# Patient Record
Sex: FEMALE | Race: WHITE | NOT HISPANIC OR LATINO | URBAN - METROPOLITAN AREA
[De-identification: names, ages, dates, MRNs, and addresses within clinical notes are randomized per-mention and may not be internally consistent; named-entity substitution may affect disease eponyms.]

---

## 2017-10-12 RX ORDER — ACETAMINOPHEN 325 MG/1
650 TABLET ORAL EVERY 6 HOURS PRN
COMMUNITY

## 2017-10-12 RX ORDER — ASCORBIC ACID 500 MG
500 TABLET ORAL DAILY
COMMUNITY

## 2017-10-12 NOTE — PRE-PROCEDURE INSTRUCTIONS
Pre-Surgery Instructions:   Medication Instructions    acetaminophen (TYLENOL) 325 mg tablet Instructed patient per Anesthesia Guidelines   ascorbic acid (VITAMIN C) 500 mg tablet Instructed patient per Anesthesia Guidelines  REVIEWED  PRINTED SURGICAL INSTRUCTIONS WITH PATIENT , PATIENT VERBALIZED UNDERSTANDING   MEDICATIONS REVIEWED    Please call   [phone]  With time of surgery    Home phone not working well

## 2017-10-13 NOTE — H&P
H&P Exam - Gynecology   Silvana Dukes 50 y o  female MRN: [id number]  Unit/Bed#:  Encounter: [id number]        Assessment: chronic right pelvic pain                         Complex right ovarian cyst    Plan: laparoscopic right cystectomy           Possible RSO           Bilateral salpingectomy                             HPI:  Silvana Dukes is a 50 y  o  female who presents with chronic right pelvic pain worse since   U/S on 17: ut 10 6 x 4 3 x 5 9 cm no fibroids  Normal left ovary  Right ovary:complex cyst; 4 0 x 2 6 x 3 2 cm  Endometrial ablation '  BTL '00   x2  Normal PAP with negative HPV   Denies any vaginal bleeding  Review of Systems   Constitutional: Negative  HENT: Negative  Eyes: Negative  Respiratory: Negative  Cardiovascular: Negative  Gastrointestinal: Negative  Endocrine: Negative  Genitourinary: Positive for pelvic pain  Musculoskeletal: Negative  Skin: Negative  Allergic/Immunologic: Negative  Neurological: Negative  Hematological: Negative  Psychiatric/Behavioral: Negative  PMH/PSH see HPI    OB/GYN History: x2    No family history on file  Social History   History   Alcohol Use    Yes     Comment: occasional     History   Drug Use No     History   Smoking Status    Never Smoker   Smokeless Tobacco    Never Used     MEDS: none    Tab:none    No Known Allergies    Objective   Vitals: Height 5' 2" (1 575 m), weight 69 4 kg (153 lb)  No intake or output data in the 24 hours ending 10/13/17 1008    Invasive Devices: Invasive Devices          No matching active lines, drains, or airways          Physical Exam   Constitutional: She is oriented to person, place, and time  She appears well-developed and well-nourished  HENT:   Head: Normocephalic and atraumatic  Neck: Normal range of motion  Neck supple     Cardiovascular: Normal rate and regular rhythm  Pulmonary/Chest: Effort normal and breath sounds normal    Abdominal: Soft  Bowel sounds are normal    Genitourinary: Vagina normal and uterus normal  Cervix exhibits no motion tenderness, no discharge and no friability  Right adnexum displays mass, tenderness and fullness  Left adnexum displays no mass, no tenderness and no fullness  Musculoskeletal: Normal range of motion  Neurological: She is alert and oriented to person, place, and time  She has normal reflexes  Skin: Skin is warm and dry  Psychiatric: She has a normal mood and affect   Her behavior is normal  Judgment and thought content normal

## 2017-10-16 ENCOUNTER — ANESTHESIA EVENT (OUTPATIENT)
Dept: PERIOP | Facility: HOSPITAL | Age: 47
End: 2017-10-16
Payer: COMMERCIAL

## 2017-10-17 ENCOUNTER — HOSPITAL ENCOUNTER (OUTPATIENT)
Facility: HOSPITAL | Age: 47
Setting detail: OUTPATIENT SURGERY
Discharge: HOME | End: 2017-10-17
Attending: OBSTETRICS & GYNECOLOGY | Admitting: OBSTETRICS & GYNECOLOGY
Payer: COMMERCIAL

## 2017-10-17 ENCOUNTER — ANESTHESIA (OUTPATIENT)
Dept: PERIOP | Facility: HOSPITAL | Age: 47
End: 2017-10-17
Payer: COMMERCIAL

## 2017-10-17 VITALS
HEART RATE: 70 BPM | WEIGHT: 153 LBS | TEMPERATURE: 97.2 F | BODY MASS INDEX: 28.16 KG/M2 | OXYGEN SATURATION: 98 % | SYSTOLIC BLOOD PRESSURE: 105 MMHG | DIASTOLIC BLOOD PRESSURE: 59 MMHG | HEIGHT: 62 IN | RESPIRATION RATE: 16 BRPM

## 2017-10-17 DIAGNOSIS — R10.31 RIGHT LOWER QUADRANT PAIN: ICD-10-CM

## 2017-10-17 DIAGNOSIS — N83.209 OVARIAN CYST: ICD-10-CM

## 2017-10-17 PROCEDURE — 88305 TISSUE EXAM BY PATHOLOGIST: CPT | Performed by: OBSTETRICS & GYNECOLOGY

## 2017-10-17 RX ORDER — ROCURONIUM BROMIDE 10 MG/ML
INJECTION, SOLUTION INTRAVENOUS AS NEEDED
Status: DISCONTINUED | OUTPATIENT
Start: 2017-10-17 | End: 2017-10-17 | Stop reason: SURG

## 2017-10-17 RX ORDER — MIDAZOLAM HYDROCHLORIDE 1 MG/ML
INJECTION INTRAMUSCULAR; INTRAVENOUS AS NEEDED
Status: DISCONTINUED | OUTPATIENT
Start: 2017-10-17 | End: 2017-10-17 | Stop reason: SURG

## 2017-10-17 RX ORDER — FENTANYL CITRATE/PF 50 MCG/ML
50 SYRINGE (ML) INJECTION
Status: DISCONTINUED | OUTPATIENT
Start: 2017-10-17 | End: 2017-10-17

## 2017-10-17 RX ORDER — FENTANYL CITRATE/PF 50 MCG/ML
25 SYRINGE (ML) INJECTION
Status: DISCONTINUED | OUTPATIENT
Start: 2017-10-17 | End: 2017-10-17 | Stop reason: HOSPADM

## 2017-10-17 RX ORDER — ONDANSETRON 2 MG/ML
4 INJECTION INTRAMUSCULAR; INTRAVENOUS ONCE AS NEEDED
Status: DISCONTINUED | OUTPATIENT
Start: 2017-10-17 | End: 2017-10-17 | Stop reason: HOSPADM

## 2017-10-17 RX ORDER — LIDOCAINE HYDROCHLORIDE 10 MG/ML
INJECTION, SOLUTION INFILTRATION; PERINEURAL AS NEEDED
Status: DISCONTINUED | OUTPATIENT
Start: 2017-10-17 | End: 2017-10-17 | Stop reason: SURG

## 2017-10-17 RX ORDER — KETOROLAC TROMETHAMINE 30 MG/ML
INJECTION, SOLUTION INTRAMUSCULAR; INTRAVENOUS AS NEEDED
Status: DISCONTINUED | OUTPATIENT
Start: 2017-10-17 | End: 2017-10-17 | Stop reason: SURG

## 2017-10-17 RX ORDER — FENTANYL CITRATE 50 UG/ML
INJECTION, SOLUTION INTRAMUSCULAR; INTRAVENOUS AS NEEDED
Status: DISCONTINUED | OUTPATIENT
Start: 2017-10-17 | End: 2017-10-17 | Stop reason: SURG

## 2017-10-17 RX ORDER — IBUPROFEN 600 MG/1
600 TABLET ORAL EVERY 6 HOURS PRN
Status: DISCONTINUED | OUTPATIENT
Start: 2017-10-17 | End: 2017-10-17 | Stop reason: HOSPADM

## 2017-10-17 RX ORDER — SODIUM CHLORIDE, SODIUM LACTATE, POTASSIUM CHLORIDE, CALCIUM CHLORIDE 600; 310; 30; 20 MG/100ML; MG/100ML; MG/100ML; MG/100ML
125 INJECTION, SOLUTION INTRAVENOUS CONTINUOUS
Status: DISCONTINUED | OUTPATIENT
Start: 2017-10-17 | End: 2017-10-17 | Stop reason: HOSPADM

## 2017-10-17 RX ORDER — PROPOFOL 10 MG/ML
INJECTION, EMULSION INTRAVENOUS AS NEEDED
Status: DISCONTINUED | OUTPATIENT
Start: 2017-10-17 | End: 2017-10-17 | Stop reason: SURG

## 2017-10-17 RX ORDER — GLYCOPYRROLATE 0.2 MG/ML
INJECTION INTRAMUSCULAR; INTRAVENOUS AS NEEDED
Status: DISCONTINUED | OUTPATIENT
Start: 2017-10-17 | End: 2017-10-17 | Stop reason: SURG

## 2017-10-17 RX ORDER — EPHEDRINE SULFATE 50 MG/ML
INJECTION, SOLUTION INTRAVENOUS AS NEEDED
Status: DISCONTINUED | OUTPATIENT
Start: 2017-10-17 | End: 2017-10-17 | Stop reason: SURG

## 2017-10-17 RX ORDER — OXYCODONE HYDROCHLORIDE AND ACETAMINOPHEN 5; 325 MG/1; MG/1
1 TABLET ORAL EVERY 4 HOURS PRN
Qty: 5 TABLET | Refills: 0 | Status: SHIPPED | OUTPATIENT
Start: 2017-10-17

## 2017-10-17 RX ORDER — BUPIVACAINE HYDROCHLORIDE 2.5 MG/ML
INJECTION, SOLUTION INFILTRATION; PERINEURAL AS NEEDED
Status: DISCONTINUED | OUTPATIENT
Start: 2017-10-17 | End: 2017-10-17 | Stop reason: HOSPADM

## 2017-10-17 RX ORDER — OXYCODONE HYDROCHLORIDE AND ACETAMINOPHEN 5; 325 MG/1; MG/1
1 TABLET ORAL EVERY 4 HOURS PRN
Status: DISCONTINUED | OUTPATIENT
Start: 2017-10-17 | End: 2017-10-17 | Stop reason: HOSPADM

## 2017-10-17 RX ADMIN — SODIUM CHLORIDE, SODIUM LACTATE, POTASSIUM CHLORIDE, AND CALCIUM CHLORIDE: .6; .31; .03; .02 INJECTION, SOLUTION INTRAVENOUS at 11:11

## 2017-10-17 RX ADMIN — MIDAZOLAM HYDROCHLORIDE 2 MG: 1 INJECTION, SOLUTION INTRAMUSCULAR; INTRAVENOUS at 10:06

## 2017-10-17 RX ADMIN — FENTANYL CITRATE 25 MCG: 50 INJECTION INTRAMUSCULAR; INTRAVENOUS at 11:42

## 2017-10-17 RX ADMIN — EPHEDRINE SULFATE 5 MG: 50 INJECTION, SOLUTION INTRAMUSCULAR; INTRAVENOUS; SUBCUTANEOUS at 10:53

## 2017-10-17 RX ADMIN — IBUPROFEN 600 MG: 600 TABLET ORAL at 13:49

## 2017-10-17 RX ADMIN — SODIUM CHLORIDE, SODIUM LACTATE, POTASSIUM CHLORIDE, AND CALCIUM CHLORIDE 125 ML/HR: .6; .31; .03; .02 INJECTION, SOLUTION INTRAVENOUS at 07:44

## 2017-10-17 RX ADMIN — FENTANYL CITRATE 25 MCG: 50 INJECTION INTRAMUSCULAR; INTRAVENOUS at 11:49

## 2017-10-17 RX ADMIN — KETOROLAC TROMETHAMINE 30 MG: 30 INJECTION, SOLUTION INTRAMUSCULAR at 10:54

## 2017-10-17 RX ADMIN — GLYCOPYRROLATE 0.8 MG: 0.2 INJECTION, SOLUTION INTRAMUSCULAR; INTRAVENOUS at 11:02

## 2017-10-17 RX ADMIN — DEXAMETHASONE SODIUM PHOSPHATE 10 MG: 10 INJECTION INTRAMUSCULAR; INTRAVENOUS at 10:18

## 2017-10-17 RX ADMIN — FENTANYL CITRATE 25 MCG: 50 INJECTION, SOLUTION INTRAMUSCULAR; INTRAVENOUS at 11:01

## 2017-10-17 RX ADMIN — FENTANYL CITRATE 25 MCG: 50 INJECTION INTRAMUSCULAR; INTRAVENOUS at 11:55

## 2017-10-17 RX ADMIN — FENTANYL CITRATE 100 MCG: 50 INJECTION, SOLUTION INTRAMUSCULAR; INTRAVENOUS at 10:12

## 2017-10-17 RX ADMIN — OXYCODONE HYDROCHLORIDE AND ACETAMINOPHEN 1 TABLET: 5; 325 TABLET ORAL at 14:19

## 2017-10-17 RX ADMIN — NEOSTIGMINE METHYLSULFATE 4 MG: 1 INJECTION, SOLUTION INTRAMUSCULAR; INTRAVENOUS; SUBCUTANEOUS at 11:02

## 2017-10-17 RX ADMIN — LIDOCAINE HYDROCHLORIDE 100 MG: 10 INJECTION, SOLUTION INFILTRATION; PERINEURAL at 10:12

## 2017-10-17 RX ADMIN — PROPOFOL 200 MG: 10 INJECTION, EMULSION INTRAVENOUS at 10:12

## 2017-10-17 RX ADMIN — ROCURONIUM BROMIDE 30 MG: 10 INJECTION, SOLUTION INTRAVENOUS at 10:12

## 2017-10-17 NOTE — DISCHARGE INSTRUCTIONS
Laparoscopic Salpingo-oophorectomy   WHAT YOU SHOULD KNOW:   Laparoscopic salpingo-oophorectomy is surgery to remove one or both fallopian tubes together with the ovaries  AFTER YOU LEAVE:   Medicines:   · NSAIDs  help decrease swelling, pain, and fever  This medicine is available without a doctor's order  NSAIDs can cause stomach bleeding or kidney problems  If you take blood thinner medicine, always ask your primary healthcare provider (PHP) if NSAIDs are safe for you  Always read the medicine label and follow directions  · Acetaminophen  decreases pain and fever  It is available without a doctor's order  Ask how much to take and how often to take it  Follow directions  Acetaminophen can cause liver damage if not taken correctly  · Prescription pain medicine  may be given  Ask your PHP how to take this medicine safely  · Take your medicine as directed  Contact your PHP if you think your medicine is not helping or if you have side effects  Tell him if you are allergic to any medicine  Keep a list of the medicines, vitamins, and herbs you take  Include the amounts, and when and why you take them  Bring the list or the pill bottles to follow-up visits  Carry your medicine list with you in case of an emergency  Follow up with your surgeon or gynecologist as directed:  Write down your questions so you remember to ask them during your visits  Wound care:  Carefully wash the wound with soap and water  Dry the area and put on new, clean bandages as directed  Change your bandages when they get wet or dirty  Counseling: This surgery may be life-changing for you and your family  Sudden changes in the levels of your hormones may occur and cause mood swings and depression  You may feel angry, sad, or frightened, or cry frequently and unexpectedly  These feelings are normal  Talk to your caregivers, family, or friends about your feelings   You may need to attend meetings with a caregiver, family members, or other people who are close to you  These meetings can help everyone better understand your condition, surgery, and care  Activity:  Ask when you can return to your usual activities, such as exercise  It is best to start exercise slowly and do more as you get stronger  Exercise makes your heart stronger, lowers blood pressure, and keeps your bones healthy  Contact your surgeon or gynecologist if:   · You have a fever  · You have chills, a cough, or feel weak and achy  · You have nausea or are vomiting  · Your skin is itchy, swollen, or has a rash  · You have questions or concerns about your condition or care  Seek care immediately or call 911 if:   · You feel lightheaded, short of breath, and have chest pain  · You cough up blood  · Your arm or leg feels warm, tender, and painful  It may look swollen and red  · You feel something is bulging into your vagina, or you have vaginal bleeding  · You have lower abdominal or back pain that does not go away even after you take medicine  · You have pus or a foul-smelling odor coming from your vagina  · You have trouble urinating or having a bowel movement  · Blood soaks through your bandage  · Your symptoms return  © 2014 [employer] is for End User's use only and may not be sold, redistributed or otherwise used for commercial purposes  All illustrations and images included in CareNotes® are the copyrighted property of [employer]  or [employer]  The above information is an  only  It is not intended as medical advice for individual conditions or treatments  Talk to your doctor, nurse or pharmacist before following any medical regimen to see if it is safe and effective for you

## 2017-10-17 NOTE — OP NOTE
OPERATIVE REPORT  PATIENT NAME: Terese Shah    :  1970  MRN: [id number]  Pt Location: [facility] OR ROOM 05    SURGERY DATE: 10/17/2017    Surgeon(s) and Role:     * Danna Nolasco MD - Primary     * Bonnie Alberts MD - Assisting    Preop Diagnosis:  Ovarian cyst [R82 406]  Right lower quadrant pain [R10 31]    Post-Op Diagnosis Codes:     * Ovarian cyst [D20 870]     * Right lower quadrant pain [R10 31]    Procedure(s) (LRB):  LAPAROSCOPIC SALPINGECTOMY RIGHT AND LEFT TUBE; EXCISION OF OVARIAN CYST; POSSIBLE RSO (N/A)    Specimen(s):  ID Type Source Tests Collected by Time Destination   1 : right ovary and tube Tissue Ovary, Right TISSUE EXAM Danna Nolasco MD 10/17/2017 105    2 :  Tissue Fallopian Tube, Left TISSUE EXAM Danna Nolasco MD 10/17/2017 105        Estimated Blood Loss:   <10cc    Drains:  None    Anesthesia Type:   General    Operative Indications:  Ovarian cyst [N83 209]  Right lower quadrant pain [R10 31]      Operative Findings:  A normal appearing external genitalia  Normal appearing cervix  Uterus sounded to 8 cm  Normal appearing left ovary  Right ovarian cyst  Bilateral fimbriae present  Minimal adhesive disease  Endometrial implants noted in the posterior cul-de-sac    Complications:   None    Procedure and Technique:  The patient was taking to the operating room where a timeout was performed to confirm correct patient and correct procedure  The patient was given general anesthesia and was then positioned  on the operating table in the dorsal lithotomy position with legs Supported by stirrups  All pressure points were padded and the Emilio hugger was placed to maintain control of core body temperature  The patient was then prepped and draped in the usual sterile fashion  A straight catheter was used to drain urine from the bladder  A weighted speculum was inserted for visualization of the cervix, which was then grasped with a single-tooth tenaculum    A dilator was then attached to the tenaculum and used for uterine manipulation       Attention was then turned to the abdomen where a 5 mm vertical incision was made in the infraumbilical region and a 5 mm trocar was introduced under direct visualization with the laparoscope within the trocar  The laparoscope was then placed through the trocar and pneumoperitoneum was established, using carbon dioxide  The abdomen was inspected in a symmetric manner  The uterus ovaries and remnants of fallopian tubes were identified  A right ovarian cyst was appreciated  An Additional 5 mm right lateral port was inserted under direct visualization  As well as a 10 mm suprapubic port to assist with removal of specimen        Attention was then turned to the pelvis  The right ovary  was stabilized by grasping the ovarian  tissue with the atraumatic forceps along the along with the tubal remnants  The ovary and fallopian tube were tented up at the inferior Infundibulum ligament and the EnSeal was placed across this and several bites were taken with good visualization while coagulating and cutting  Attention was then turned to the contralateral adnexa  The remnant of the left fallopian tube was grasped and the EnSeal device used to coagulate and cut along the mesosalpinx  The specimens were placed in an Endo-Catch bag and removed through the 10 mm port without difficulty  Specimen was then sent to pathology  The pelvic and abdomen were inspected, and good hemostasis was confirmed  Pneumoperitoneum was evacuated  The fascia of the 10 mm port was closed with PDS and the remaining ports were removed and the incisions was closed using  4-0 Monocryl  Suture and Histoacryl      Attention was then turned to the perineum  The sponge stick  was removed and hemostasis was confirmed  All needles , sponge, instruction count was correct x3 at the end of the procedure   The patient tolerated the procedure well and was transferred to the recovery room in stable condition  Dr Dori Mcconnell was present and participate din the entire procedure        Patient Disposition:  PACU     SIGNATURE: Leidy Louis MD  DATE: October 17, 2017  TIME: 11:27 AM

## 2017-10-17 NOTE — ANESTHESIA PREPROCEDURE EVALUATION
Review of Systems/Medical History      No history of anesthetic complications     Cardiovascular  Negative cardio ROS Exercise tolerance: good,     Pulmonary  Negative pulmonary ROS ,        GI/Hepatic  Negative GI/hepatic ROS          Negative  ROS        Endo/Other  Negative endo/other ROS      GYN       Hematology  Negative hematology ROS      Musculoskeletal  Negative musculoskeletal ROS        Neurology  Negative neurology ROS      Psychology   Negative psychology ROS            Physical Exam    Airway    Mallampati score: II  TM Distance: >3 FB  Neck ROM: full     Dental   No notable dental hx     Cardiovascular  Comment: Negative ROS, Cardiovascular exam normal    Pulmonary  Breath sounds clear to auscultation,     Other Findings        Anesthesia Plan  ASA Score- 1       Anesthesia Type- general with ASA Monitors  Additional Monitors:   Airway Plan: ETT  Induction- intravenous  Informed Consent- Anesthetic plan and risks discussed with patient and spouse  I personally reviewed this patient with the CRNA  Discussed and agreed on the Anesthesia Plan with the CRNA  Jai Almanza

## 2017-10-17 NOTE — ANESTHESIA POSTPROCEDURE EVALUATION
Post-Op Assessment Note      CV Status:  Stable    Mental Status:  Awake    Hydration Status:  Euvolemic    PONV Controlled:  Controlled    Airway Patency:  Patent    Post Op Vitals Reviewed: Yes          Staff: Anesthesiologist, other anesthesia staff           BP      Temp      Pulse     Resp      SpO2

## 2019-09-13 ENCOUNTER — ANNUAL EXAM (OUTPATIENT)
Dept: OBGYN CLINIC | Facility: CLINIC | Age: 49
End: 2019-09-13
Payer: COMMERCIAL

## 2019-09-13 VITALS
WEIGHT: 142 LBS | HEIGHT: 64 IN | DIASTOLIC BLOOD PRESSURE: 80 MMHG | SYSTOLIC BLOOD PRESSURE: 130 MMHG | BODY MASS INDEX: 24.24 KG/M2

## 2019-09-13 DIAGNOSIS — Z12.11 SCREENING FOR COLON CANCER: ICD-10-CM

## 2019-09-13 DIAGNOSIS — Z01.419 ENCOUNTER FOR ANNUAL ROUTINE GYNECOLOGICAL EXAMINATION: ICD-10-CM

## 2019-09-13 DIAGNOSIS — Z12.39 SCREENING FOR BREAST CANCER: Primary | ICD-10-CM

## 2019-09-13 PROCEDURE — 99396 PREV VISIT EST AGE 40-64: CPT | Performed by: OBSTETRICS & GYNECOLOGY

## 2019-09-13 NOTE — PROGRESS NOTES
The patient is here for a yearly  The patient is not due for a pap smear  No bleeding or cramping  No vaginal or urinary issues  No breast concerns

## 2019-09-13 NOTE — PROGRESS NOTES
Assessment/Plan:    Impression:  1  Normal Breast and GYN exam - right ovary not present due to RSO in 2017      Plan:  1  Rx given for mammogram  2  Rx given for colonoscopy    3  Patient educated on healthy diet and exercise    Subjective:      Patient ID: Anthony Baird is a  44 y o  female presenting for her annual exam  She has a history of RSO in 2017, and [address] in 2006  Patient denies bleeding and pelvic pain  Patient is adopted - no known family history  Review of Systems   Constitutional: Negative  Negative for fatigue, fever and unexpected weight change  HENT: Negative  Eyes: Negative  Respiratory: Negative  Negative for chest tightness, shortness of breath, wheezing and stridor  Cardiovascular: Negative  Negative for chest pain, palpitations and leg swelling  Gastrointestinal: Negative  Negative for abdominal pain, blood in stool, diarrhea, nausea, rectal pain and vomiting  Endocrine: Negative  Genitourinary: Negative for dysuria, frequency, vaginal bleeding, vaginal discharge and vaginal pain  Musculoskeletal: Negative  Skin: Negative  Allergic/Immunologic: Negative  Neurological: Negative  Hematological: Negative  Psychiatric/Behavioral: Negative  All other systems reviewed and are negative  Objective:      /80 (BP Location: Left arm, Patient Position: Sitting, Cuff Size: Standard)   Ht 5' 4 17" (1 63 m)   Wt 64 4 kg (142 lb)   LMP  (LMP Unknown)   BMI 24 24 kg/m²          Physical Exam   Constitutional: She is oriented to person, place, and time  She appears well-developed and well-nourished  HENT:   Head: Normocephalic and atraumatic  Neck: Normal range of motion  Neck supple  No tracheal deviation present  No thyromegaly present  Cardiovascular: Normal rate, regular rhythm and normal heart sounds  Pulmonary/Chest: Effort normal and breath sounds normal  No stridor  No respiratory distress  She has no wheezes   She has no rales  She exhibits no tenderness  Right breast exhibits no inverted nipple, no mass, no nipple discharge, no skin change and no tenderness  Left breast exhibits no inverted nipple, no mass, no nipple discharge, no skin change and no tenderness  No breast swelling, tenderness, discharge or bleeding  Breasts are symmetrical    Abdominal: Soft  Bowel sounds are normal  She exhibits no distension and no mass  There is no tenderness  There is no rebound and no guarding  No hernia  Hernia confirmed negative in the right inguinal area and confirmed negative in the left inguinal area  Genitourinary: Vagina normal and uterus normal  Rectal exam shows no external hemorrhoid, no internal hemorrhoid, no fissure and no mass  No breast swelling, tenderness, discharge or bleeding  No labial fusion  There is no rash, tenderness, lesion or injury on the right labia  There is no rash, tenderness, lesion or injury on the left labia  Uterus is not deviated, not enlarged, not fixed and not tender  Cervix exhibits no motion tenderness, no discharge and no friability  Right adnexum displays no mass, no tenderness and no fullness  Left adnexum displays no mass, no tenderness and no fullness  No erythema, tenderness or bleeding in the vagina  No foreign body in the vagina  No signs of injury around the vagina  No vaginal discharge found  Genitourinary Comments: Patient absent right ovary  No cystocele  No rectocele  Lymphadenopathy: No inguinal adenopathy noted on the right or left side  Neurological: She is alert and oriented to person, place, and time  Skin: Skin is warm and dry  Psychiatric: She has a normal mood and affect   Her behavior is normal  Judgment and thought content normal

## 2019-09-13 NOTE — PROGRESS NOTES
Assessment/Plan:    Impression:  1  Normal Breast and GYN exam - right ovary not present due to RSO in 2017     Plan:  1  Rx given for mammogram  2  Rx given for colonoscopy    3  Patient educated on healthy diet and exercise    Subjective:      Patient ID: Yazmin Jorge is a  44 y o  female presenting for her annual exam  She has a history of RSO in 2017, and [address] in 2006  Patient denies bleeding and pelvic pain  Patient is adopted - no known family history  Patients 21year old daughter is going through female to male transgender transition  Review of Systems   Constitutional: Negative  Negative for fatigue, fever and unexpected weight change  HENT: Negative  Eyes: Negative  Respiratory: Negative  Negative for chest tightness, shortness of breath, wheezing and stridor  Cardiovascular: Negative  Negative for chest pain, palpitations and leg swelling  Gastrointestinal: Negative  Negative for abdominal pain, blood in stool, diarrhea, nausea, rectal pain and vomiting  Endocrine: Negative  Genitourinary: Negative for dysuria, frequency, vaginal bleeding, vaginal discharge and vaginal pain  Musculoskeletal: Negative  Skin: Negative  Allergic/Immunologic: Negative  Neurological: Negative  Hematological: Negative  Psychiatric/Behavioral: Negative  All other systems reviewed and are negative  Objective:      /80 (BP Location: Left arm, Patient Position: Sitting, Cuff Size: Standard)   Ht 5' 4 17" (1 63 m)   Wt 64 4 kg (142 lb)   LMP  (LMP Unknown)   BMI 24 24 kg/m²          Physical Exam   Constitutional: She is oriented to person, place, and time  She appears well-developed and well-nourished  HENT:   Head: Normocephalic and atraumatic  Neck: Normal range of motion  Neck supple  No tracheal deviation present  No thyromegaly present  Cardiovascular: Normal rate, regular rhythm and normal heart sounds     Pulmonary/Chest: Effort normal and breath sounds normal  No stridor  No respiratory distress  She has no wheezes  She has no rales  She exhibits no tenderness  Right breast exhibits no inverted nipple, no mass, no nipple discharge, no skin change and no tenderness  Left breast exhibits no inverted nipple, no mass, no nipple discharge, no skin change and no tenderness  No breast swelling, tenderness, discharge or bleeding  Breasts are symmetrical    Abdominal: Soft  Bowel sounds are normal  She exhibits no distension and no mass  There is no tenderness  There is no rebound and no guarding  No hernia  Hernia confirmed negative in the right inguinal area and confirmed negative in the left inguinal area  Genitourinary: Vagina normal and uterus normal  Rectal exam shows no external hemorrhoid, no internal hemorrhoid, no fissure and no mass  No labial fusion  There is no rash, tenderness, lesion or injury on the right labia  There is no rash, tenderness, lesion or injury on the left labia  Uterus is not deviated, not enlarged, not fixed and not tender  Cervix exhibits no motion tenderness, no discharge and no friability  Right adnexum displays no mass, no tenderness and no fullness  Left adnexum displays no mass, no tenderness and no fullness  No erythema, tenderness or bleeding in the vagina  No foreign body in the vagina  No signs of injury around the vagina  No vaginal discharge found  Genitourinary Comments: Patient absent right ovary  No cystocele  No rectocele  Lymphadenopathy: No inguinal adenopathy noted on the right or left side  Neurological: She is alert and oriented to person, place, and time  Skin: Skin is warm and dry  Psychiatric: She has a normal mood and affect   Her behavior is normal  Judgment and thought content normal

## 2020-09-16 ENCOUNTER — ANNUAL EXAM (OUTPATIENT)
Dept: OBGYN CLINIC | Facility: CLINIC | Age: 50
End: 2020-09-16
Payer: COMMERCIAL

## 2020-09-16 VITALS
SYSTOLIC BLOOD PRESSURE: 120 MMHG | WEIGHT: 157 LBS | BODY MASS INDEX: 28.89 KG/M2 | DIASTOLIC BLOOD PRESSURE: 80 MMHG | HEIGHT: 62 IN

## 2020-09-16 DIAGNOSIS — Z12.11 SCREENING FOR COLORECTAL CANCER: ICD-10-CM

## 2020-09-16 DIAGNOSIS — Z01.419 ROUTINE GYNECOLOGICAL EXAMINATION: ICD-10-CM

## 2020-09-16 DIAGNOSIS — Z01.419 ENCOUNTER FOR ANNUAL ROUTINE GYNECOLOGICAL EXAMINATION: ICD-10-CM

## 2020-09-16 DIAGNOSIS — Z12.12 SCREENING FOR COLORECTAL CANCER: ICD-10-CM

## 2020-09-16 DIAGNOSIS — Z11.51 SCREENING FOR HPV (HUMAN PAPILLOMAVIRUS): ICD-10-CM

## 2020-09-16 DIAGNOSIS — Z12.31 ENCOUNTER FOR SCREENING MAMMOGRAM FOR BREAST CANCER: Primary | ICD-10-CM

## 2020-09-16 PROCEDURE — 99396 PREV VISIT EST AGE 40-64: CPT | Performed by: OBSTETRICS & GYNECOLOGY

## 2020-09-16 NOTE — PROGRESS NOTES
The patient is here for a yearly  right cystectomy RSO (10/2017), pap normal HPV neg 9/6/17, pap normal HPV neg 12/9/14, pap normal HPV neg 12/20/13  No bleeding or cramping  No vaginal, bowel, bladder or breast problems

## 2020-09-16 NOTE — PROGRESS NOTES
Assessment/Plan:     Normal breast and gyn exam   Due for colonoscopy  Plan:  Rx mammogram Rx colonoscopy  Continue healthy diet and exercise  Add extra vitamin-D 3 and zinc to her vitamin-C  Recommend flu vaccine    Subjective:      Patient ID: Lorrie Ruth is a 44 y  o  female presents for yearly exam with no complaints  Patient denies any pelvic pain vaginal bleeding breast bowel or bladder issues  Patient due for colonoscopy  Request given  No change in family history  Medications reviewed  Patient taking vitamin-C  Explained the benefits of vitamin D3 and zinc   Patient says she will consider getting a flu vaccine  Review of Systems   Constitutional: Negative  HENT: Negative  Eyes: Negative  Respiratory: Negative  Cardiovascular: Negative  Gastrointestinal: Negative  Endocrine: Negative  Musculoskeletal: Negative  Skin: Negative  Allergic/Immunologic: Negative  Neurological: Negative  Hematological: Negative  Psychiatric/Behavioral: Negative  All other systems reviewed and are negative  Objective:      /80 (BP Location: Left arm, Patient Position: Sitting, Cuff Size: Standard)   Ht 5' 1 81" (1 57 m)   Wt 71 2 kg (157 lb)   LMP  (LMP Unknown)   BMI 28 89 kg/m²          Physical Exam  Constitutional:       Appearance: She is well-developed  HENT:      Head: Normocephalic and atraumatic  Neck:      Musculoskeletal: Normal range of motion and neck supple  Thyroid: No thyromegaly  Trachea: No tracheal deviation  Cardiovascular:      Rate and Rhythm: Normal rate and regular rhythm  Heart sounds: Normal heart sounds  Pulmonary:      Effort: Pulmonary effort is normal  No respiratory distress  Breath sounds: Normal breath sounds  No stridor  No wheezing or rales  Chest:      Chest wall: No tenderness        Breasts: Breasts are symmetrical          Right: No inverted nipple, mass, nipple discharge, skin change or tenderness  Left: No inverted nipple, mass, nipple discharge, skin change or tenderness  Abdominal:      General: Bowel sounds are normal  There is no distension  Palpations: Abdomen is soft  There is no mass  Tenderness: There is no abdominal tenderness  There is no guarding or rebound  Hernia: No hernia is present  There is no hernia in the left inguinal area  Genitourinary:     Labia:         Right: No rash, tenderness, lesion or injury  Left: No rash, tenderness, lesion or injury  Vagina: Normal  No signs of injury and foreign body  No vaginal discharge, erythema, tenderness or bleeding  Cervix: No cervical motion tenderness, discharge or friability  Uterus: Not deviated, not enlarged, not fixed and not tender  Adnexa:         Right: No mass, tenderness or fullness  Left: No mass, tenderness or fullness  Rectum: No mass, anal fissure, external hemorrhoid or internal hemorrhoid  Lymphadenopathy:      Lower Body: No right inguinal adenopathy  No left inguinal adenopathy  Skin:     General: Skin is warm and dry  Neurological:      Mental Status: She is alert and oriented to person, place, and time  Psychiatric:         Behavior: Behavior normal          Thought Content:  Thought content normal          Judgment: Judgment normal

## 2020-09-19 LAB
HPV HR 12 DNA CVX QL NAA+PROBE: DETECTED
HPV16 DNA SPEC QL NAA+PROBE: NOT DETECTED
HPV18 DNA SPEC QL NAA+PROBE: NOT DETECTED
THIN PREP CVX: ABNORMAL

## 2021-03-01 ENCOUNTER — OFFICE VISIT (OUTPATIENT)
Dept: OBGYN CLINIC | Facility: CLINIC | Age: 51
End: 2021-03-01
Payer: COMMERCIAL

## 2021-03-01 VITALS
WEIGHT: 155 LBS | SYSTOLIC BLOOD PRESSURE: 110 MMHG | HEIGHT: 62 IN | DIASTOLIC BLOOD PRESSURE: 66 MMHG | BODY MASS INDEX: 28.52 KG/M2

## 2021-03-01 DIAGNOSIS — Z01.419 ENCOUNTER FOR ANNUAL ROUTINE GYNECOLOGICAL EXAMINATION: ICD-10-CM

## 2021-03-01 DIAGNOSIS — R87.810 CERVICAL HIGH RISK HPV (HUMAN PAPILLOMAVIRUS) TEST POSITIVE: Primary | ICD-10-CM

## 2021-03-01 PROCEDURE — 99213 OFFICE O/P EST LOW 20 MIN: CPT | Performed by: OBSTETRICS & GYNECOLOGY

## 2021-03-01 NOTE — PROGRESS NOTES
Returns to the office for repeat Pap  Patient's Pap smear in September 2020 was negative with positive high-risk HPV  Patient denies any abnormal vaginal bleeding or pelvic pain  Physical exam external genitalia normal   Vagina clear  Cervix no lesions  Pap smear performed  Impression:  Normal cervical exam   Positive HPV with normal cells on Pap smear September 2020  Plan:  Check Pap smear  Continue healthy diet and exercise  Continue vitamins    Recommend COVID-19 vaccine

## 2021-03-09 ENCOUNTER — PROCEDURE VISIT (OUTPATIENT)
Dept: OBGYN CLINIC | Facility: CLINIC | Age: 51
End: 2021-03-09
Payer: COMMERCIAL

## 2021-03-09 VITALS
WEIGHT: 156.8 LBS | BODY MASS INDEX: 28.85 KG/M2 | HEIGHT: 62 IN | SYSTOLIC BLOOD PRESSURE: 110 MMHG | DIASTOLIC BLOOD PRESSURE: 70 MMHG

## 2021-03-09 DIAGNOSIS — R87.613 HSIL (HIGH GRADE SQUAMOUS INTRAEPITHELIAL LESION) ON PAP SMEAR OF CERVIX: Primary | ICD-10-CM

## 2021-03-09 PROCEDURE — 57456 ENDOCERV CURETTAGE W/SCOPE: CPT | Performed by: OBSTETRICS & GYNECOLOGY

## 2021-03-09 NOTE — PROGRESS NOTES
Colposcopy    Date/Time: 3/9/2021 9:35 AM  Performed by: Emmie Rosa MD  Authorized by: Emmie Rosa MD     Consent:     Consent obtained:  Written    Consent given by:  Patient    Procedural risks discussed:  Bleeding    Patient questions answered: yes      Patient agrees, verbalizes understanding, and wants to proceed: yes      Instructions and paperwork completed: yes    Pre-procedure:     Pre-procedure timeout performed: yes      Prepped with: acetic acid    Indication:     Indication:  HSIL  Procedure:     Procedure: Colposcopy w/ endocervical curettage      Under satisfactory analgesia the patient was prepped and draped in the dorsal lithotomy position: yes      Orem speculum was placed in the vagina: yes      Under colposcopic examination the transition zone was seen in entirety: yes      Intracervical block was performed: no      Endocervix was curetted using a Kevorkian curette: yes      Tampon inserted: no      Monsel's solution was applied: no      Biopsy(s): yes      Location:  Ecc    Specimen to pathology: yes    Post-procedure:     Findings: Bleeding    Comments:      Normal Pap smear September 2020 with positive high-grade HPV    Repeat Pap smear March 2021 high-grade ADRIEN  No lesion seen in the vagina on the cervix or in the endocervical canal   ECC was performed

## 2021-03-12 LAB — BIOPSY SPEC-IMP: NORMAL

## 2021-04-06 DIAGNOSIS — Z23 ENCOUNTER FOR IMMUNIZATION: ICD-10-CM

## 2021-09-13 ENCOUNTER — OFFICE VISIT (OUTPATIENT)
Dept: OBGYN CLINIC | Facility: CLINIC | Age: 51
End: 2021-09-13
Payer: COMMERCIAL

## 2021-09-13 VITALS
HEIGHT: 62 IN | WEIGHT: 151 LBS | DIASTOLIC BLOOD PRESSURE: 70 MMHG | BODY MASS INDEX: 27.79 KG/M2 | SYSTOLIC BLOOD PRESSURE: 100 MMHG

## 2021-09-13 DIAGNOSIS — R87.613 HSIL (HIGH GRADE SQUAMOUS INTRAEPITHELIAL LESION) ON PAP SMEAR OF CERVIX: Primary | ICD-10-CM

## 2021-09-13 DIAGNOSIS — Z12.31 ENCOUNTER FOR SCREENING MAMMOGRAM FOR MALIGNANT NEOPLASM OF BREAST: ICD-10-CM

## 2021-09-13 PROCEDURE — 99213 OFFICE O/P EST LOW 20 MIN: CPT | Performed by: OBSTETRICS & GYNECOLOGY

## 2021-09-13 NOTE — PROGRESS NOTES
Patient returns to the office for repeat Pap smear patient had negative ECC on call scopic examination 3/21  Her Pap smear prior was high-grade lesion  Physical exam:  External genitalia normal   Vagina clear  Cervix no lesions  Pap smear performed  Impression:  History of abnormal Pap smears  Normal ECC on colposcopic examination 3/21  Plan:  Check Pap smear    Rx mammogram   Return to office 6 months for yearly and Pap smear

## 2021-09-16 LAB — THIN PREP CVX: ABNORMAL

## 2021-10-11 ENCOUNTER — PROCEDURE VISIT (OUTPATIENT)
Dept: OBGYN CLINIC | Facility: CLINIC | Age: 51
End: 2021-10-11
Payer: COMMERCIAL

## 2021-10-11 VITALS — BODY MASS INDEX: 27.07 KG/M2 | SYSTOLIC BLOOD PRESSURE: 124 MMHG | DIASTOLIC BLOOD PRESSURE: 70 MMHG | WEIGHT: 148 LBS

## 2021-10-11 DIAGNOSIS — R87.612 LOW GRADE SQUAMOUS INTRAEPITH LESION ON CYTOLOGIC SMEAR CERVIX (LGSIL): Primary | ICD-10-CM

## 2021-10-11 PROCEDURE — 57456 ENDOCERV CURETTAGE W/SCOPE: CPT | Performed by: OBSTETRICS & GYNECOLOGY

## 2021-11-01 ENCOUNTER — PROCEDURE VISIT (OUTPATIENT)
Dept: OBGYN CLINIC | Facility: CLINIC | Age: 51
End: 2021-11-01
Payer: COMMERCIAL

## 2021-11-01 VITALS — SYSTOLIC BLOOD PRESSURE: 102 MMHG | DIASTOLIC BLOOD PRESSURE: 68 MMHG

## 2021-11-01 DIAGNOSIS — R87.613 HSIL (HIGH GRADE SQUAMOUS INTRAEPITHELIAL LESION) ON PAP SMEAR OF CERVIX: Primary | ICD-10-CM

## 2021-11-01 DIAGNOSIS — N87.1 MODERATE DYSPLASIA OF CERVIX (CIN II): ICD-10-CM

## 2021-11-01 PROCEDURE — 57522 CONIZATION OF CERVIX: CPT | Performed by: OBSTETRICS & GYNECOLOGY

## 2021-11-23 ENCOUNTER — OFFICE VISIT (OUTPATIENT)
Dept: OBGYN CLINIC | Facility: CLINIC | Age: 51
End: 2021-11-23

## 2021-11-23 DIAGNOSIS — Z98.890 S/P LEEP: Primary | ICD-10-CM

## 2021-11-23 PROCEDURE — 99024 POSTOP FOLLOW-UP VISIT: CPT | Performed by: OBSTETRICS & GYNECOLOGY

## 2024-05-29 ENCOUNTER — ANNUAL EXAM (OUTPATIENT)
Dept: GYNECOLOGY | Facility: CLINIC | Age: 54
End: 2024-05-29
Payer: COMMERCIAL

## 2024-05-29 VITALS
BODY MASS INDEX: 23 KG/M2 | SYSTOLIC BLOOD PRESSURE: 118 MMHG | HEIGHT: 62 IN | WEIGHT: 125 LBS | DIASTOLIC BLOOD PRESSURE: 62 MMHG

## 2024-05-29 DIAGNOSIS — Z98.890 HISTORY OF LOOP ELECTRICAL EXCISION PROCEDURE (LEEP): ICD-10-CM

## 2024-05-29 DIAGNOSIS — Z12.31 SCREENING MAMMOGRAM FOR BREAST CANCER: ICD-10-CM

## 2024-05-29 DIAGNOSIS — Z12.11 SCREENING FOR COLORECTAL CANCER: ICD-10-CM

## 2024-05-29 DIAGNOSIS — Z01.419 ENCOUNTER FOR ANNUAL ROUTINE GYNECOLOGICAL EXAMINATION: ICD-10-CM

## 2024-05-29 DIAGNOSIS — Z12.12 SCREENING FOR COLORECTAL CANCER: ICD-10-CM

## 2024-05-29 DIAGNOSIS — R87.613 HSIL (HIGH GRADE SQUAMOUS INTRAEPITHELIAL LESION) ON PAP SMEAR OF CERVIX: Primary | ICD-10-CM

## 2024-05-29 PROCEDURE — S0612 ANNUAL GYNECOLOGICAL EXAMINA: HCPCS | Performed by: OBSTETRICS & GYNECOLOGY

## 2024-06-02 LAB — THIN PREP CVX: NORMAL

## 2024-06-19 ENCOUNTER — TELEPHONE (OUTPATIENT)
Age: 54
End: 2024-06-19

## 2024-06-19 ENCOUNTER — PREP FOR PROCEDURE (OUTPATIENT)
Age: 54
End: 2024-06-19

## 2024-06-19 DIAGNOSIS — Z12.11 SCREENING FOR COLON CANCER: Primary | ICD-10-CM

## 2024-06-19 NOTE — TELEPHONE ENCOUNTER
Scheduled date of colonoscopy (as of today): 7/29   Physician performing colonoscopy:  EARL   [facility]   Bowel prep reviewed with patient: ANDRAE/SANDY   Instructions reviewed with patient by: [email or web address]  Clearances: NONE

## 2024-06-19 NOTE — TELEPHONE ENCOUNTER
Patient calling to reschedule her colonoscopy. Colonoscopy has been rescheduled for 7/20/24 with  at [facility]. Patient has prep instructions.

## 2024-06-19 NOTE — TELEPHONE ENCOUNTER
History:   Diagnosis Date    Anorexia     Loss of appetite    Atherosclerotic heart disease of native coronary artery without angina pectoris 11/23/2022    Coronary artery disease involving native coronary artery of native heart without angina pectoris    Cardiac murmur, unspecified 10/08/2019    Systolic murmur    Contusion of other part of head, initial encounter 11/15/2019    Facial hematoma    Contusion of right knee, initial encounter 08/16/2022    Patellar contusion, right, initial encounter    Diverticulitis of large intestine with perforation and abscess without bleeding 09/29/2020    Colonic diverticular abscess    Dyslipidemia     Encounter for other preprocedural examination 08/21/2020    Pre-operative clearance    Encounter for other preprocedural examination 02/08/2021    Preoperative examination    Encounter for preprocedural cardiovascular examination 08/21/2020    Encounter for pre-operative cardiovascular clearance    Encounter for screening for malignant neoplasm of colon 12/08/2022    Screening for colorectal cancer    Hyperlipidemia, unspecified     Dyslipidemia    Major depressive disorder, single episode, in full remission (CMS/[facility]) 02/08/2021    Major depressive disorder with single episode, in full remission    Multiple fractures of ribs, right side, subsequent encounter for fracture with routine healing 08/16/2022    Closed fracture of multiple ribs of right side with routine healing, subsequent encounter    Non-ST elevation (NSTEMI) myocardial infarction (CMS/[facility]) 08/19/2020    NSTEMI, initial episode of care    Old myocardial infarction     History of myocardial infarction    Other specified symptoms and signs involving the circulatory  06/19/24  Screened by: Nell Balderas    Referring Provider  ROBERT PHILLIPS    Pre- Screening:     BMI 22.86   Has patient been referred for a routine screening Colonoscopy? yes  Is the patient between 45-75 years old? yes      Previous Colonoscopy no   If yes:    Date:     Facility:     Reason:       Does the patient want to see a Gastroenterologist prior to their procedure OR are they having any GI symptoms? no    Has the patient been hospitalized or had abdominal surgery in the past 6 months? no    Does the patient use supplemental oxygen? no    Does the patient take Coumadin, Lovenox, Plavix, Elliquis, Xarelto, or other blood thinning medication? no    Has the patient had a stroke, cardiac event, or stent placed in the past year? no      If patient is between 45yrs - 49yrs, please advise patient that we will have to confirm benefits & coverage with their insurance company for a routine screening colonoscopy.     mechanism 04/07/2021    History of anemia    Personal history of other benign neoplasm     History of other benign neoplasm    Personal history of other diseases of the circulatory system 07/22/2021    History of aortic valve stenosis    Personal history of other diseases of the circulatory system     History of hypertension    Personal history of other diseases of the digestive system     History of gastroesophageal reflux (GERD)    Personal history of other diseases of the musculoskeletal system and connective tissue 02/03/2020    History of neck pain    Personal history of other diseases of the musculoskeletal system and connective tissue 02/03/2020    History of stiff neck    Personal history of other endocrine, nutritional and metabolic disease 02/05/2020    History of hyperlipidemia    Personal history of other mental and behavioral disorders     History of depression    Personal history of other specified conditions 01/04/2021    History of vertigo    Personal history of other specified conditions 02/03/2020    History of dizziness    Personal history of peptic ulcer disease     History of peptic ulcer    Personal history of pneumonia (recurrent)     History of pneumonia    Pleurodynia 08/16/2022    Chest pain, pleuritic    Post-traumatic osteoarthritis, right shoulder 02/05/2020    Post-traumatic osteoarthritis of both shoulders    Postconcussional syndrome 02/05/2020    Post-concussion syndrome    Prediabetes 04/07/2021    Prediabetes    Stiffness of unspecified shoulder, not elsewhere classified 02/03/2020    Shoulder stiffness    Unspecified osteoarthritis, unspecified site     DJD (degenerative joint disease)       No results found for this or any previous  visit (from the past 96 hour(s)).     Transthoracic Echo (TTE) Complete    Result Date: 1/15/2024              [place] [address]      Phone [phone] Fax [phone] TRANSTHORACIC ECHOCARDIOGRAM REPORT  Patient Name:      CHAYO SIMONS     Reading Physician:   [id number] Dilan Olson  Study Date:        1/15/2024          Ordering Provider:   [id number] RAJ JURADO MRN/PID:           [id number]           Fellow: Accession#:        [id number]       Nurse:               Franca Orr Date of Birth/Age: 1939 / 85      Sonographer:         Catrachita Monk RDCS                    years Gender:            F                  Additional Staff: Height:            144.78 cm          Admit Date: Weight:            66.68 kg           Admission Status:    Outpatient BSA:               1.58 m2            Department Location: [facility] Echo Lab Blood Pressure: 140 /58 mmHg Study Type:    TRANSTHORACIC ECHO (TTE) COMPLETE Diagnosis/ICD: Presence of prosthetic heart valve-Z95.2 Indication:    TAVR CPT Codes:     Echo Complete w Full Doppler-[id number] Patient History: Pertinent History: TAVR. Study Detail: The following Echo studies were performed: 2D, M-Mode and Doppler.               Optison used as a contrast agent for endocardial border               definition.  PHYSICIAN INTERPRETATION: Left Ventricle: Left ventricular systolic function is normal, with an estimated ejection fraction of 60%. There are no regional wall motion abnormalities. The left ventricular cavity size is normal. The left ventricular septal wall thickness is mildly increased. There is mildly increased left ventricular posterior wall thickness. There is mild concentric left ventricular hypertrophy. Spectral Doppler shows an impaired relaxation pattern of left ventricular diastolic filling. Left Atrium: The left atrium is moderately dilated. Right Ventricle: The right ventricle is normal in size. There is normal  right ventricular global systolic function. Right Atrium: The right atrium is upper limits of normal in size. Aortic Valve: There is a prosthetic aortic valve present. There is trivial aortic valve regurgitation. The peak instantaneous gradient of the aortic valve is 6.1 mmHg. The mean gradient of the aortic valve is 3.0 mmHg. TAVR with a mean aortic valve gradient of 3.0 mmHg and dimensionless index 0.98. Mitral Valve: The mitral valve is mild to moderately thickened. There is moderate mitral annular calcification. There is trace mitral valve regurgitation. Tricuspid Valve: The tricuspid valve is structurally normal. There is trace tricuspid regurgitation. Pulmonic Valve: The pulmonic valve is structurally normal. There is no indication of pulmonic valve regurgitation. Pericardium: There is no pericardial effusion noted. Aorta: The aortic root is abnormal.  CONCLUSIONS:  1. Left ventricular systolic function is normal with a 60% estimated ejection fraction.  2. Spectral Doppler shows an impaired relaxation pattern of left ventricular diastolic filling.  3. The left atrium is moderately dilated.  4. There is moderate mitral annular calcification.  5. TAVR with a mean aortic valve gradient of 3.0 mmHg and dimensionless index 0.98. QUANTITATIVE DATA SUMMARY: 2D MEASUREMENTS:                          Normal Ranges: LAs:           3.90 cm   (2.7-4.0cm) IVSd:          1.20 cm   (0.6-1.1cm) LVPWd:         1.20 cm   (0.6-1.1cm) LVIDd:         3.80 cm   (3.9-5.9cm) LVIDs:         2.70 cm LV Mass Index: 97.1 g/m2 LV % FS        28.9 % LA VOLUME:                               Normal Ranges: LA Vol A4C:        64.1 ml    (22+/-6mL/m2) LA Vol A2C:        81.2 ml LA Vol BP:         73.1 ml LA Vol Index A4C:  40.6ml/m2 LA Vol Index A2C:  51.5 ml/m2 LA Vol Index BP:   46.3 ml/m2 LA Area A4C:       20.7 cm2 LA Area A2C:       23.6 cm2 LA Major Axis A4C: 5.7 cm LA Major Axis A2C: 5.8 cm RA VOLUME BY A/L METHOD:                        Normal Ranges: RA Area A4C: 10.7 cm2 AORTA MEASUREMENTS:                    Normal Ranges: Asc Ao, d: 3.00 cm (2.1-3.4cm) LV SYSTOLIC FUNCTION BY 2D PLANIMETRY (MOD):                     Normal Ranges: EF-A4C View: 60.5 % (>=55%) EF-A2C View: 70.9 % EF-Biplane:  66.5 % LV DIASTOLIC FUNCTION:                           Normal Ranges: MV Peak E:    1.19 m/s    (0.7-1.2 m/s) MV Peak A:    1.60 m/s    (0.42-0.7 m/s) E/A Ratio:    0.74        (1.0-2.2) MV e'         0.06 m/s    (>8.0) MV lateral e' 0.07 m/s MV medial e'  0.06 m/s MV A Dur:     141.00 msec E/e' Ratio:   18.31       (<8.0) MITRAL VALVE:                      Normal Ranges: MV mean P.0 mmHg (<48mmHg) MV DT:      236 msec (150-240msec) AORTIC VALVE:                                   Normal Ranges: AoV Vmax:                1.23 m/s (<=1.7m/s) AoV Peak P.1 mmHg (<20mmHg) AoV Mean PG:             3.0 mmHg (1.7-11.5mmHg) LVOT Max Kip:            1.22 m/s (<=1.1m/s) AoV VTI:                 26.00 cm (18-25cm) LVOT VTI:                25.50 cm LVOT Diameter:           1.70 cm  (1.8-2.4cm) AoV Area, VTI:           2.23 cm2 (2.5-5.5cm2) AoV Area,Vmax:           2.25 cm2 (2.5-4.5cm2) AoV Dimensionless Index: 0.98  RIGHT VENTRICLE: RV Basal 2.85 cm RV Mid   2.26 cm RV Major 5.6 cm  [id number] Dilan Olson DO Electronically signed on 1/15/2024 at 12:21:50 PM  ** Final **     Transthoracic Echo (TTE) Limited    Result Date: 2023   [facility], [address] [place] [address]                Tel [phone] and Fax [phone] TRANSTHORACIC ECHOCARDIOGRAM REPORT  Patient Name:      CHAYO Ruiz Physician:    [id number] Christian Pavon MD Study Date:        2023          Ordering Provider:    [id number] RAJ JURADO MRN/PID:           [id number]            Fellow: Accession#:        [id number]        Nurse: Date of Birth/Age: 1939 / 84 years Sonographer:           Dorene Cam                                                              [facility] Gender:            F                   Additional Staff: Height:            144.78 cm           Admit Date:           12/22/2023 Weight:            66.68 kg            Admission Status:     Inpatient - Routine BSA:               1.58 m2             Encounter#:           [id number]                                        Department Location:  [facility] Non                                                              Invasive Blood Pressure: 136 /61 mmHg Study Type:    TRANSTHORACIC ECHO (TTE) LIMITED Diagnosis/ICD: Presence of prosthetic heart valve-Z95.2 Indication:    s/p TAVR CPT Code:      Echo Limited-95646; Color Doppler-83827; Doppler Limited-69684 Patient History: Pertinent History: CHF; CAD; HTN; Severe AS s/p TAVR 26mm Medtronic Evolut Fx                    (12/22/23). Study Detail: The following Echo studies were performed: 2D, M-Mode, Doppler and               color flow. Technically challenging study due to poor acoustic               windows.  PHYSICIAN INTERPRETATION: Left Ventricle: The left ventricular systolic function is hyperdynamic, with an estimated ejection fraction of 70-75%. The left ventricular cavity size was not assessed. Left ventricular diastolic filling was not assessed. Left Atrium: The left atrium is severely dilated. Right Ventricle: The right ventricle was not assessed. There is normal right ventricular global systolic function. Right Atrium: The right atrium is normal in size. Aortic Valve: There is a prosthetic aortic valve present. There is Evolut Fx transcatheter aortic valve replacement, with a 26 mm reported size. There is no jose angel-prosthetic aortic valve regurgitation. Echo findings are consistent with normal aortic valve prosthesis structure and function. There is no evidence of aortic valve regurgitation. The peak instantaneous gradient of the aortic valve is 19.2 mmHg. The mean  gradient of the aortic valve is 9.0 mmHg. Mitral Valve: The mitral valve is mildly thickened. There is mild to moderate mitral annular calcification. There is trace to mild mitral valve regurgitation. Tricuspid Valve: The tricuspid valve is structurally normal. There is trace tricuspid regurgitation. The right ventricular systolic pressure is unable to be estimated. Pulmonic Valve: The pulmonic valve was not assessed. Pulmonic valve regurgitation was not assessed. Pericardium: There is no pericardial effusion noted. Aorta: The aortic root was not assessed. Systemic Veins: The inferior vena cava appears to be of normal size. In comparison to the previous echocardiogram(s): Compared with study from 12/22/2023, no significant change.  CONCLUSIONS:  1. Left ventricular systolic function is hyperdynamic with a 70-75% estimated ejection fraction.  2. The left atrium is severely dilated.  3. There is Evolut Fx transcatheter aortic valve replacement, with a 26 mm reported size. There is no jose angel-prosthetic aortic valve regurgitation. Echo findings are consistent with normal aortic valve prosthesis structure and function. QUANTITATIVE DATA SUMMARY: 2D MEASUREMENTS:                    Normal Ranges: Ao Root d: 2.60 cm (2.0-3.7cm) LAs:       4.20 cm (2.7-4.0cm) LA VOLUME:                             Normal Ranges: LA Volume Index: 56.6 ml/m2 RA VOLUME BY A/L METHOD:                       Normal Ranges: RA Area A4C: 15.0 cm2 AORTA MEASUREMENTS:                    Normal Ranges: Asc Ao, d: 3.10 cm (2.1-3.4cm) LV SYSTOLIC FUNCTION BY 2D PLANIMETRY (MOD):                     Normal Ranges: EF-A4C View: 61.3 % (>=55%) EF-A2C View: 74.3 % EF-Biplane:  68.4 % LV DIASTOLIC FUNCTION:                               Normal Ranges: MV Peak E:        1.43 m/s    (0.7-1.2 m/s) MV Peak A:        1.45 m/s    (0.42-0.7 m/s) E/A Ratio:        0.99        (1.0-2.2) MV e'             0.06 m/s    (>8.0) MV lateral e'     0.07 m/s MV medial e'       msec AORTIC VALVE:                                    Normal Ranges: AoV Vmax:                2.19 m/s  (<=1.7m/s) AoV Peak P.2 mmHg (<20mmHg) AoV Mean P.0 mmHg  (1.7-11.5mmHg) LVOT Max Kip:            1.34 m/s  (<=1.1m/s) AoV VTI:                 40.90 cm  (18-25cm) LVOT VTI:                28.20 cm LVOT Diameter:           1.70 cm   (1.8-2.4cm) AoV Area, VTI:           1.56 cm2  (2.5-5.5cm2) AoV Area,Vmax:           1.39 cm2  (2.5-4.5cm2) AoV Dimensionless Index: 0.69  RIGHT VENTRICLE: RV Basal 3.77 cm RV Mid   2.97 cm RV Major 5.7 cm Pulmonary Veins: PulmV A Revs Dur: 121.00 msec PulmV A Revs Kip: 40.90 cm/s PulmV Lacey Kip:   53.50 cm/s PulmV S/D Kip:    1.40 PulmV Sys Kip:    73.50 cm/s  [id number] Christian Pavon MD Electronically signed on 2023 at 9:34:49 AM  ** Final **     Transthoracic Echo (TTE) Limited    Result Date: 2023   [facility], [address] [place] [address]                Tel [phone] and Fax [phone] TRANSTHORACIC ECHOCARDIOGRAM REPORT  Patient Name:     CHAYO Ruiz Physician:   [id number] Melchor Marin MD Study Date:       2023         Ordering Provider:   [id number] RAJ JURADO MRN/PID:          [id number]           Fellow: Accession#:       [id number]       Nurse: Date of           1939      Sonographer:         Sun Cox RDCS Birth/Age:        years Gender:           F                  Additional Staff: Height:           144.78 cm          Admit Date:          2023 Weight:           69.85 kg           Admission Status:    Inpatient - Routine BSA:              1.61 m2             Encounter#:          [id number]                                      Department Location: [facility] Cath                                                           Lab Blood Pressure: 136 /73 mmHg Study Type:    TRANSTHORACIC ECHO (TTE) LIMITED Diagnosis/ICD: Nonrheumatic aortic (valve) stenosis-I35.0 Indication:    TAVR Periprocedure CPT Code:      Echo Limited-28867; Color Doppler-97467; Doppler Limited-98104 Patient History: Pertinent History: CHF, CAD and HTN. severe AS. Study Detail: The following Echo studies were performed: 2D, M-Mode, Doppler and               color flow. Technically challenging study due to patient lying in               supine position and prominent lung artifact.  PHYSICIAN INTERPRETATION: Left Ventricle: The left ventricular systolic function is normal, with an estimated ejection fraction of 65-70%. There are no regional wall motion abnormalities. The left ventricular cavity size is normal. Left ventricular diastolic filling was not assessed. Left Atrium: The left atrium is upper limits of normal in size. Right Ventricle: The right ventricle is normal in size. There is normal right ventricular global systolic function. Right Atrium: The right atrium is normal in size. Aortic Valve: The aortic valve was not well visualized. The aortic valve dimensionless index is 0.25. There is no evidence of aortic valve regurgitation. The peak instantaneous gradient of the aortic valve is 74.3 mmHg. The mean gradient of the aortic valve is 46.0 mmHg. Mitral Valve: The mitral valve was not well visualized. There is moderate to severe mitral annular calcification. Mitral valve regurgitation was not assessed. Tricuspid Valve: The tricuspid valve was not assessed. Tricuspid regurgitation was not assessed. Pulmonic Valve: The pulmonic valve is not well visualized. The pulmonic valve regurgitation was not well visualized. Pericardium: There is no pericardial effusion noted. Aorta: The aortic root was not  3.0 mmHg. There is a Medtronic transcatheter aortic valve replacement, with a 26 mm reported size.  CONCLUSIONS:  1. Left ventricular systolic function is normal with a 65-70% estimated ejection fraction.  2. There is moderate to severe mitral annular calcification.  3. Pre TAVR: severe aortic stenosis, peak and mean gradients 74 mmHg and 46 mmHg respectively through the aortic valve, no pericardial effusion, normal EF. Dimensionless index 0.25.  4. Post Evolut Fx 26 mm: no pericardial effusion, peak and mean aortic gradients 6 mmHg and 3 mmHg respective, no mitral regurgitaton. LV function similar to pre procedure. No aortic regurgitation. QUANTITATIVE DATA SUMMARY: 2D MEASUREMENTS:                          Normal Ranges: IVSd:          1.00 cm   (0.6-1.1cm) LVPWd:         0.80 cm   (0.6-1.1cm) LVIDd:         3.60 cm   (3.9-5.9cm) LVIDs:         2.30 cm LV Mass Index: 57.7 g/m2 LV % FS        36.1 % LA VOLUME:                               Normal Ranges: LA Vol A4C:        41.7 ml    (22+/-6mL/m2) LA Vol A2C:        69.9 ml LA Vol BP:         55.1 ml LA Vol Index A4C:  25.9ml/m2 LA Vol Index A2C:  43.4 ml/m2 LA Vol Index BP:   34.3 ml/m2 LA Area A4C:       16.7 cm2 LA Area A2C:       22.1 cm2 LA Major Axis A4C: 5.7 cm LA Major Axis A2C: 5.9 cm LV SYSTOLIC FUNCTION BY 2D PLANIMETRY (MOD):                     Normal Ranges: EF-A4C View: 61.4 % (>=55%) EF-A2C View: 77.1 % EF-Biplane:  69.9 % AORTIC VALVE:                                              Normal Ranges: AoV Vmax:                          4.31 m/s  (<=1.7m/s) AoV Vmax Post TAVR:                 1.23 m/s  (<=1.7m/s) AoV Peak P.3 mmHg (<20mmHg) AoV Peak PG Post TAVR:             6.1 mmHg  (<20mmHg) AoV Mean P.0 mmHg (1.7-11.5mmHg) AoV Mean PG Post TAVR:             3.0 mmHg  (1.7-11.5mmHg) LVOT Max Kip:                      0.99 m/s  (<=1.1m/s) LVOT Max Kip Post TAVR:            0.99 m/s  (<=1.1m/s) AoV VTI:                           101.00 cm (18-25cm) AoV VTI Post TAVR:                 27.90 cm  (18-25cm) LVOT VTI:                          25.30 cm LVOT VTI Post TAVR:                25.30 cm LVOT Diameter:                     1.90 cm   (1.8-2.4cm) LVOT Diameter Post TAVR:           1.90 cm   (1.8-2.4cm) AoV Area, VTI:                     0.71 cm2  (2.5-5.5cm2) AoV Area, VTI Post TAVR:           2.57 cm2  (2.5-5.5cm2) AoV Area,Vmax:                     0.65 cm2  (2.5-4.5cm2) AoV Area,Vmax Post TAVR:           2.29 cm2  (2.5-4.5cm2) AoV Dimensionless Index:           0.25 AoV Dimensionless Index Post TAVR: 0.91  [id number] Melchor Marin MD Electronically signed on 2023 at 8:19:37 PM  ** Final **            Heart Failure Follow up    NYHA class 1    Edema Denies  Dyspnea on Exertion Denies  Fatigue Denies  Exercise Intolerance Denies  Orthopnea Denies  PND Denies    Chest pain No  Syncope No  Palpitations No  Weight gain No  Weight loss No        All organ systems normal           KCCQ Questionnaire      1  Heart failure affects different people in different ways. Some feel shortness of breath while others feel fatigue. Please indicate how much you are limited by heart failure (shortness of breath or fatigue) in your ability to do the following activities over the past 2 weeks.     A.) Showering/bathing  5. Not at All  B.) Walking 1 block on level ground 5. Not at All  C.) Hurrying or Jogging   5. Not at All    2.  Over the past 2 weeks, how many times did you have swelling in your feet, ankles or legs when you woke up in the morning? 5.  Never    3.  Over the past 2 weeks, on average, how many times has fatigue limited your ability to do what you wanted? 7. Never    4.  Over the past 2 weeks, on average, how many times has shortness of breath limited your ability to do what you wanted? 7. Never    5.  Over the past 2 weeks, on average, how many times have you been forced to sleep sitting up in a chair or with at least 3 pillows to prop you up because of shortness of breath? Never    6. Over the past 2 weeks, how much has your heart failure limited your enjoyment of life? It has not limited my enjoyment of life    7. If you had to spend the rest of your life with your heart failure the way it is right now, how would you feel about this? 4. Mostly satisfied    8. How much does your heart failure affect your lifestyle? Please indicate how your heart failure may have limited yourparticipation in the following activities over the past 2 weeks    A.)  Hobbies, recreational activities  5. Did not limit at all    B.) Working or doing household chores  5. Did not limit at all    C.) Visiting family or friends out of your home  5. Did not limit at all    Impression  Doing well clinically, appears euvolemic on virtual exam.  -150/70s and HR 70s  Weights consistent around 150.  Recent echo shows well seated and functioning TAVR with MG 3      Plan:  - Cont current medication regimen  - ASA for life  - Cont to increase activity as tolerated   - f/u with PCP and Primary cards  - Life-long Dental SBE prophylaxis needed      Time Spent:I spent 30 minutes of a total visit of 15 minutes in counseling/ direct management/discussion/coordination of Stephanie's care.

## 2024-07-16 ENCOUNTER — ANESTHESIA EVENT (OUTPATIENT)
Dept: ANESTHESIOLOGY | Facility: HOSPITAL | Age: 54
End: 2024-07-16

## 2024-07-16 ENCOUNTER — ANESTHESIA (OUTPATIENT)
Dept: ANESTHESIOLOGY | Facility: HOSPITAL | Age: 54
End: 2024-07-16

## 2024-07-30 ENCOUNTER — ANESTHESIA (OUTPATIENT)
Dept: GASTROENTEROLOGY | Facility: MEDICAL CENTER | Age: 54
End: 2024-07-30

## 2024-07-30 ENCOUNTER — HOSPITAL ENCOUNTER (OUTPATIENT)
Dept: GASTROENTEROLOGY | Facility: MEDICAL CENTER | Age: 54
Setting detail: OUTPATIENT SURGERY
Discharge: HOME | End: 2024-07-30
Payer: COMMERCIAL

## 2024-07-30 ENCOUNTER — ANESTHESIA EVENT (OUTPATIENT)
Dept: GASTROENTEROLOGY | Facility: MEDICAL CENTER | Age: 54
End: 2024-07-30

## 2024-07-30 VITALS
SYSTOLIC BLOOD PRESSURE: 113 MMHG | HEIGHT: 62 IN | DIASTOLIC BLOOD PRESSURE: 57 MMHG | OXYGEN SATURATION: 99 % | HEART RATE: 93 BPM | TEMPERATURE: 96.8 F | RESPIRATION RATE: 18 BRPM | BODY MASS INDEX: 23 KG/M2 | WEIGHT: 125 LBS

## 2024-07-30 DIAGNOSIS — Z12.11 SCREENING FOR COLON CANCER: ICD-10-CM

## 2024-07-30 DIAGNOSIS — Z00.00 HEALTHCARE MAINTENANCE: Primary | ICD-10-CM

## 2024-07-30 PROCEDURE — 45385 COLONOSCOPY W/LESION REMOVAL: CPT | Performed by: INTERNAL MEDICINE

## 2024-07-30 PROCEDURE — 88305 TISSUE EXAM BY PATHOLOGIST: CPT | Performed by: PATHOLOGY

## 2024-07-30 RX ORDER — PROPOFOL 10 MG/ML
INJECTION, EMULSION INTRAVENOUS AS NEEDED
Status: DISCONTINUED | OUTPATIENT
Start: 2024-07-30 | End: 2024-07-30

## 2024-07-30 RX ORDER — LABETALOL HYDROCHLORIDE 5 MG/ML
5 INJECTION, SOLUTION INTRAVENOUS
Status: DISCONTINUED | OUTPATIENT
Start: 2024-07-30 | End: 2024-08-03 | Stop reason: HOSPADM

## 2024-07-30 RX ORDER — MEPERIDINE HYDROCHLORIDE 25 MG/ML
12.5 INJECTION INTRAMUSCULAR; INTRAVENOUS; SUBCUTANEOUS ONCE
Status: DISCONTINUED | OUTPATIENT
Start: 2024-07-30 | End: 2024-08-03 | Stop reason: HOSPADM

## 2024-07-30 RX ORDER — FENTANYL CITRATE/PF 50 MCG/ML
25 SYRINGE (ML) INJECTION
Status: DISCONTINUED | OUTPATIENT
Start: 2024-07-30 | End: 2024-08-03 | Stop reason: HOSPADM

## 2024-07-30 RX ORDER — SODIUM CHLORIDE 9 MG/ML
125 INJECTION, SOLUTION INTRAVENOUS CONTINUOUS
Status: DISCONTINUED | OUTPATIENT
Start: 2024-07-30 | End: 2024-08-03 | Stop reason: HOSPADM

## 2024-07-30 RX ORDER — PROMETHAZINE HYDROCHLORIDE 25 MG/ML
12.5 INJECTION, SOLUTION INTRAMUSCULAR; INTRAVENOUS ONCE AS NEEDED
Status: DISCONTINUED | OUTPATIENT
Start: 2024-07-30 | End: 2024-08-03 | Stop reason: HOSPADM

## 2024-07-30 RX ORDER — ONDANSETRON 2 MG/ML
4 INJECTION INTRAMUSCULAR; INTRAVENOUS ONCE AS NEEDED
Status: DISCONTINUED | OUTPATIENT
Start: 2024-07-30 | End: 2024-08-03 | Stop reason: HOSPADM

## 2024-07-30 RX ORDER — HYDROMORPHONE HYDROCHLORIDE 2 MG/ML
0.5 INJECTION, SOLUTION INTRAMUSCULAR; INTRAVENOUS; SUBCUTANEOUS
Status: DISCONTINUED | OUTPATIENT
Start: 2024-07-30 | End: 2024-08-03 | Stop reason: HOSPADM

## 2024-07-30 RX ORDER — LIDOCAINE HYDROCHLORIDE 20 MG/ML
INJECTION, SOLUTION EPIDURAL; INFILTRATION; INTRACAUDAL; PERINEURAL AS NEEDED
Status: DISCONTINUED | OUTPATIENT
Start: 2024-07-30 | End: 2024-07-30

## 2024-07-30 RX ORDER — LIDOCAINE HYDROCHLORIDE 10 MG/ML
0.5 INJECTION, SOLUTION EPIDURAL; INFILTRATION; INTRACAUDAL; PERINEURAL ONCE AS NEEDED
Status: DISCONTINUED | OUTPATIENT
Start: 2024-07-30 | End: 2024-08-03 | Stop reason: HOSPADM

## 2024-07-30 RX ORDER — ALBUTEROL SULFATE 2.5 MG/3ML
2.5 SOLUTION RESPIRATORY (INHALATION) ONCE AS NEEDED
Status: DISCONTINUED | OUTPATIENT
Start: 2024-07-30 | End: 2024-08-03 | Stop reason: HOSPADM

## 2024-07-30 RX ADMIN — LIDOCAINE HYDROCHLORIDE 80 MG: 20 INJECTION, SOLUTION EPIDURAL; INFILTRATION; INTRACAUDAL at 09:09

## 2024-07-30 RX ADMIN — PROPOFOL 30 MG: 10 INJECTION, EMULSION INTRAVENOUS at 09:24

## 2024-07-30 RX ADMIN — PROPOFOL 30 MG: 10 INJECTION, EMULSION INTRAVENOUS at 09:13

## 2024-07-30 RX ADMIN — PROPOFOL 30 MG: 10 INJECTION, EMULSION INTRAVENOUS at 09:20

## 2024-07-30 RX ADMIN — PROPOFOL 30 MG: 10 INJECTION, EMULSION INTRAVENOUS at 09:18

## 2024-07-30 RX ADMIN — PROPOFOL 30 MG: 10 INJECTION, EMULSION INTRAVENOUS at 09:15

## 2024-07-30 RX ADMIN — SODIUM CHLORIDE 125 ML/HR: 0.9 INJECTION, SOLUTION INTRAVENOUS at 08:50

## 2024-07-30 RX ADMIN — PROPOFOL 50 MG: 10 INJECTION, EMULSION INTRAVENOUS at 09:44

## 2024-07-30 RX ADMIN — PROPOFOL 50 MG: 10 INJECTION, EMULSION INTRAVENOUS at 09:38

## 2024-07-30 RX ADMIN — PROPOFOL 50 MG: 10 INJECTION, EMULSION INTRAVENOUS at 09:40

## 2024-07-30 RX ADMIN — PROPOFOL 40 MG: 10 INJECTION, EMULSION INTRAVENOUS at 09:29

## 2024-07-30 RX ADMIN — PROPOFOL 30 MG: 10 INJECTION, EMULSION INTRAVENOUS at 09:33

## 2024-07-30 RX ADMIN — PROPOFOL 80 MG: 10 INJECTION, EMULSION INTRAVENOUS at 09:09

## 2024-07-30 NOTE — ANESTHESIA PREPROCEDURE EVALUATION
Procedure:  COLONOSCOPY    Relevant Problems   No relevant active problems        Physical Exam    Airway    Mallampati score: II  TM Distance: >3 FB  Neck ROM: full     Dental   No notable dental hx     Cardiovascular      Pulmonary      Other Findings  post-pubertal.      Anesthesia Plan  ASA Score- 2     Anesthesia Type- IV sedation with anesthesia with ASA Monitors.         Additional Monitors:     Airway Plan:     Comment: I have seen the patient and reviewed the history.  Patient to receive IV sedation with full ASA monitors.  Risks discussed with the patient, consent signed.  .       Plan Factors-Exercise tolerance (METS): >4 METS.    Chart reviewed.    Patient summary reviewed.                  Induction- intravenous.    Postoperative Plan-         Informed Consent- Anesthetic plan and risks discussed with patient.  I personally reviewed this patient with the CRNA. Discussed and agreed on the Anesthesia Plan with the CRNA..

## 2024-07-30 NOTE — H&P
"History and Physical -  [facility]  Cortney Wayne 54 y.o. female MRN: [id number]    HPI: Cortney Wayne is a 54 y.o. year old female who presents for colonoscopy.    Last Hg No results found for: \"HGB\"  Last INR   Lab Results   Component Value Date    INR 0.9 10/20/2020     Last Platelets No results found for: \"PLT\"    Review of Systems    Historical Information   History reviewed. No pertinent past medical history.  Past Surgical History:   Procedure Laterality Date    CHOLECYSTECTOMY  2023    ENDOMETRIAL ABLATION      IA LAPAROSCOPY W/RMVL ADNEXAL STRUCTURES N/A 10/17/2017    Procedure: LAPAROSCOPIC SALPINGECTOMY RIGHT AND LEFT TUBE; EXCISION OF OVARIAN CYST; POSSIBLE RSO;  Surgeon: Aren Penn MD;  Location: [facility];  Service: Gynecology     Social History   Social History     Substance and Sexual Activity   Alcohol Use Not Currently     Social History     Substance and Sexual Activity   Drug Use No     Social History     Tobacco Use   Smoking Status Former    Current packs/day: 0.00    Types: Cigarettes    Quit date: 2017    Years since quittin.5   Smokeless Tobacco Never   Tobacco Comments    3 years ago     History reviewed. No pertinent family history.    Meds/Allergies     Not in a hospital admission.    No Known Allergies    Objective     /58   Pulse 64   Temp (!) 96.8 °F (36 °C) (Temporal)   Resp 16   Ht 5' 2\" (1.575 m)   Wt 56.7 kg (125 lb)   LMP  (LMP Unknown)   SpO2 99%   BMI 22.86 kg/m²     PRIOR GI PROCEDURES      PHYSICAL EXAM    Gen: NAD  CV: RRR  CHEST: Clear  ABD: soft, NT/ND  EXT: no edema  Neuro: AAO    ASSESSMENT/PLAN:  This is a 54 y.o. year old female here for colonoscopy    Plan/Procedure: colonoscopy      "

## 2024-07-30 NOTE — ANESTHESIA POSTPROCEDURE EVALUATION
Post-Op Assessment Note    CV Status:  Stable    Pain management: adequate       Mental Status:  Alert and awake   Hydration Status:  Euvolemic   PONV Controlled:  Controlled   Airway Patency:  Patent     Post Op Vitals Reviewed: Yes    No anethesia notable event occurred.    Staff: YESENIA               BP 89/50    Temp      Pulse 73 (07/30/24 0952)   Resp 18 (07/30/24 0952)    SpO2 99 % (07/30/24 0952)

## 2024-08-02 PROCEDURE — 88305 TISSUE EXAM BY PATHOLOGIST: CPT | Performed by: PATHOLOGY

## 2024-08-05 ENCOUNTER — TELEPHONE (OUTPATIENT)
Dept: GASTROENTEROLOGY | Facility: CLINIC | Age: 54
End: 2024-08-05

## 2024-08-05 NOTE — TELEPHONE ENCOUNTER
----- Message from Therese Cortez DO sent at 8/5/2024  3:11 PM EDT -----  Please place recall colonoscopy for 7 years

## 2025-01-31 RX ORDER — IBUPROFEN 200 MG
200 TABLET ORAL EVERY 6 HOURS PRN
COMMUNITY
End: 2025-02-04

## 2025-01-31 NOTE — PRE-PROCEDURE INSTRUCTIONS
Pre-Surgery Instructions:   Medication Instructions    acetaminophen (TYLENOL) 325 mg tablet Uses PRN- OK to take day of surgery    ascorbic acid (VITAMIN C) 500 mg tablet Stop taking 4 days prior to surgery.    ibuprofen (MOTRIN) 200 mg tablet Stop taking 3 days prior to surgery.   Medication instructions for day surgery reviewed. Please use only a sip of water to take your instructed medications. Avoid all over the counter vitamins, supplements and NSAIDS for one week prior to surgery per anesthesia guidelines. Tylenol is ok to take as needed.     You will receive a call one business day prior to surgery with an arrival time and hospital directions. If your surgery is scheduled on a Monday, the hospital will be calling you on the Friday prior to your surgery. If you have not heard from anyone by 8pm, please call the hospital supervisor through the hospital  at [phone]. ([place] [phone] or [place] [phone]).    Do not eat or drink anything after midnight the night before your surgery, including candy, mints, lifesavers, or chewing gum. Do not drink alcohol 24hrs before your surgery. Try not to smoke at least 24hrs before your surgery.       Follow the pre surgery showering instructions as listed in the “My Surgical Experience Booklet” or otherwise provided by your surgeon's office. Do not use a blade to shave the surgical area 1 week before surgery. It is okay to use a clean electric clippers up to 24 hours before surgery. Do not apply any lotions, creams, including makeup, cologne, deodorant, or perfumes after showering on the day of your surgery. Do not use dry shampoo, hair spray, hair gel, or any type of hair products.     No contact lenses, eye make-up, or artificial eyelashes. Remove nail polish, including gel polish, and any artificial, gel, or acrylic nails if possible. Remove all jewelry including rings and body piercing jewelry.     Wear causal clothing that is easy to take on and  off. Consider your type of surgery.    Keep any valuables, jewelry, piercings at home. Please bring any specially ordered equipment (sling, braces) if indicated.    Arrange for a responsible person to drive you to and from the hospital on the day of your surgery. Please confirm the visitor policy for the day of your procedure when you receive your phone call with an arrival time.     Call the surgeon's office with any new illnesses, exposures, or additional questions prior to surgery.    Please reference your “My Surgical Experience Booklet” for additional information to prepare for your upcoming surgery.

## 2025-02-03 ENCOUNTER — ANESTHESIA EVENT (OUTPATIENT)
Dept: PERIOP | Facility: HOSPITAL | Age: 55
End: 2025-02-03
Payer: COMMERCIAL

## 2025-02-03 PROBLEM — M65.312 TRIGGER THUMB OF LEFT HAND: Status: ACTIVE | Noted: 2025-02-03

## 2025-02-03 NOTE — H&P
H&P - Plastic Surgery   Name: Cortney Wayne 54 y.o. female I MRN: [id number]  Unit/Bed#:  I Date of Admission: (Not on file)   Date of Service: 2/3/2025 I Hospital Day: 0     Assessment & Plan  Trigger thumb of left hand      History of Present Illness   Cortney Wayne is a 54 y.o. female who presents with pain tenderness left thumb frozen left thumb tenderness over the A1 pulley left thumb.    Review of Systems  Historical Information   I have reviewed the patient's PMH, PSH, Social History, Family History, Meds, and Allergies    Objective :       Physical Exam  Examination of the head ears eyes nose and throat is within normal limits heart sounds S1-S2 heard no murmurs or gallops lungs clear abdomen soft extremities are within normal limits except for the left thumb findings are as above    Lab Results: I have reviewed the following results:          VTE Prophylaxis: Sequential compression device (Venodyne)

## 2025-02-04 ENCOUNTER — HOSPITAL ENCOUNTER (OUTPATIENT)
Facility: HOSPITAL | Age: 55
Setting detail: OUTPATIENT SURGERY
Discharge: HOME | End: 2025-02-04
Attending: PLASTIC SURGERY | Admitting: PLASTIC SURGERY
Payer: COMMERCIAL

## 2025-02-04 ENCOUNTER — ANESTHESIA (OUTPATIENT)
Dept: PERIOP | Facility: HOSPITAL | Age: 55
End: 2025-02-04
Payer: COMMERCIAL

## 2025-02-04 VITALS
TEMPERATURE: 96.7 F | OXYGEN SATURATION: 95 % | RESPIRATION RATE: 16 BRPM | DIASTOLIC BLOOD PRESSURE: 53 MMHG | HEIGHT: 62 IN | SYSTOLIC BLOOD PRESSURE: 111 MMHG | HEART RATE: 73 BPM | BODY MASS INDEX: 23 KG/M2 | WEIGHT: 125 LBS

## 2025-02-04 RX ORDER — ONDANSETRON 2 MG/ML
INJECTION INTRAMUSCULAR; INTRAVENOUS AS NEEDED
Status: DISCONTINUED | OUTPATIENT
Start: 2025-02-04 | End: 2025-02-04

## 2025-02-04 RX ORDER — MAGNESIUM HYDROXIDE 1200 MG/15ML
LIQUID ORAL AS NEEDED
Status: DISCONTINUED | OUTPATIENT
Start: 2025-02-04 | End: 2025-02-04 | Stop reason: HOSPADM

## 2025-02-04 RX ORDER — CEFAZOLIN SODIUM 2 G/50ML
SOLUTION INTRAVENOUS AS NEEDED
Status: DISCONTINUED | OUTPATIENT
Start: 2025-02-04 | End: 2025-02-04

## 2025-02-04 RX ORDER — DIPHENHYDRAMINE HYDROCHLORIDE 50 MG/ML
12.5 INJECTION INTRAMUSCULAR; INTRAVENOUS ONCE AS NEEDED
Status: DISCONTINUED | OUTPATIENT
Start: 2025-02-04 | End: 2025-02-04 | Stop reason: HOSPADM

## 2025-02-04 RX ORDER — SODIUM CHLORIDE, SODIUM LACTATE, POTASSIUM CHLORIDE, CALCIUM CHLORIDE 600; 310; 30; 20 MG/100ML; MG/100ML; MG/100ML; MG/100ML
50 INJECTION, SOLUTION INTRAVENOUS CONTINUOUS
Status: DISCONTINUED | OUTPATIENT
Start: 2025-02-04 | End: 2025-02-04 | Stop reason: HOSPADM

## 2025-02-04 RX ORDER — FENTANYL CITRATE 50 UG/ML
INJECTION, SOLUTION INTRAMUSCULAR; INTRAVENOUS AS NEEDED
Status: DISCONTINUED | OUTPATIENT
Start: 2025-02-04 | End: 2025-02-04

## 2025-02-04 RX ORDER — DEXAMETHASONE SODIUM PHOSPHATE 10 MG/ML
INJECTION, SOLUTION INTRAMUSCULAR; INTRAVENOUS AS NEEDED
Status: DISCONTINUED | OUTPATIENT
Start: 2025-02-04 | End: 2025-02-04

## 2025-02-04 RX ORDER — ONDANSETRON 2 MG/ML
4 INJECTION INTRAMUSCULAR; INTRAVENOUS ONCE AS NEEDED
Status: DISCONTINUED | OUTPATIENT
Start: 2025-02-04 | End: 2025-02-04 | Stop reason: HOSPADM

## 2025-02-04 RX ORDER — BUPIVACAINE HYDROCHLORIDE 5 MG/ML
INJECTION, SOLUTION EPIDURAL; INTRACAUDAL AS NEEDED
Status: DISCONTINUED | OUTPATIENT
Start: 2025-02-04 | End: 2025-02-04 | Stop reason: HOSPADM

## 2025-02-04 RX ORDER — SODIUM CHLORIDE, SODIUM LACTATE, POTASSIUM CHLORIDE, CALCIUM CHLORIDE 600; 310; 30; 20 MG/100ML; MG/100ML; MG/100ML; MG/100ML
INJECTION, SOLUTION INTRAVENOUS CONTINUOUS PRN
Status: DISCONTINUED | OUTPATIENT
Start: 2025-02-04 | End: 2025-02-04

## 2025-02-04 RX ORDER — FENTANYL CITRATE/PF 50 MCG/ML
50 SYRINGE (ML) INJECTION
Status: DISCONTINUED | OUTPATIENT
Start: 2025-02-04 | End: 2025-02-04 | Stop reason: HOSPADM

## 2025-02-04 RX ORDER — PROPOFOL 10 MG/ML
INJECTION, EMULSION INTRAVENOUS CONTINUOUS PRN
Status: DISCONTINUED | OUTPATIENT
Start: 2025-02-04 | End: 2025-02-04

## 2025-02-04 RX ORDER — MIDAZOLAM HYDROCHLORIDE 2 MG/2ML
INJECTION, SOLUTION INTRAMUSCULAR; INTRAVENOUS AS NEEDED
Status: DISCONTINUED | OUTPATIENT
Start: 2025-02-04 | End: 2025-02-04

## 2025-02-04 RX ORDER — LIDOCAINE HYDROCHLORIDE 10 MG/ML
INJECTION, SOLUTION EPIDURAL; INFILTRATION; INTRACAUDAL; PERINEURAL AS NEEDED
Status: DISCONTINUED | OUTPATIENT
Start: 2025-02-04 | End: 2025-02-04

## 2025-02-04 RX ADMIN — ONDANSETRON 4 MG: 2 INJECTION INTRAMUSCULAR; INTRAVENOUS at 07:48

## 2025-02-04 RX ADMIN — PROPOFOL 100 MCG/KG/MIN: 10 INJECTION, EMULSION INTRAVENOUS at 07:38

## 2025-02-04 RX ADMIN — LIDOCAINE HYDROCHLORIDE 50 MG: 10 SOLUTION INTRAVENOUS at 07:38

## 2025-02-04 RX ADMIN — SODIUM CHLORIDE, SODIUM LACTATE, POTASSIUM CHLORIDE, AND CALCIUM CHLORIDE: .6; .31; .03; .02 INJECTION, SOLUTION INTRAVENOUS at 06:07

## 2025-02-04 RX ADMIN — SODIUM CHLORIDE, SODIUM LACTATE, POTASSIUM CHLORIDE, AND CALCIUM CHLORIDE 50 ML/HR: .6; .31; .03; .02 INJECTION, SOLUTION INTRAVENOUS at 06:07

## 2025-02-04 RX ADMIN — FENTANYL CITRATE 100 MCG: 50 INJECTION, SOLUTION INTRAMUSCULAR; INTRAVENOUS at 07:36

## 2025-02-04 RX ADMIN — MIDAZOLAM 2 MG: 1 INJECTION INTRAMUSCULAR; INTRAVENOUS at 07:32

## 2025-02-04 RX ADMIN — CEFAZOLIN SODIUM 2000 MG: 2 SOLUTION INTRAVENOUS at 07:35

## 2025-02-04 RX ADMIN — DEXAMETHASONE SODIUM PHOSPHATE 10 MG: 10 INJECTION INTRAMUSCULAR; INTRAVENOUS at 07:48

## 2025-02-04 NOTE — ANESTHESIA PREPROCEDURE EVALUATION
"Procedure:  TENDON SHEATH INCISION OF THUMB (Left: Hand)    Relevant Problems   ANESTHESIA (within normal limits)      CARDIO (within normal limits)      ENDO (within normal limits)      GI/HEPATIC (within normal limits)      HEMATOLOGY (within normal limits)        Physical Exam    Airway    Mallampati score: II  TM Distance: >3 FB  Neck ROM: full     Dental   No notable dental hx     Cardiovascular  Cardiovascular exam normal    Pulmonary  Pulmonary exam normal     Other Findings  post-pubertal.      Anesthesia Plan  ASA Score- 1     Anesthesia Type- IV sedation with anesthesia with ASA Monitors.         Additional Monitors:     Airway Plan:            Plan Factors-Exercise tolerance (METS): >4 METS.    Chart reviewed.   Existing labs reviewed. Patient summary reviewed.    Patient is not a current smoker. Patient not instructed to abstain from smoking on day of procedure. Patient did not smoke on day of surgery.            Induction-     Postoperative Plan- Plan for postoperative opioid use.     Perioperative Resuscitation Plan - Level 1 - Full Code.       Informed Consent- Anesthetic plan and risks discussed with patient.  I personally reviewed this patient with the CRNA. Discussed and agreed on the Anesthesia Plan with the CRNA..      NPO Status:  Vitals Value Taken Time   Date of last liquid 02/03/25 02/04/25 0559   Time of last liquid 2200 02/04/25 0559   Date of last solid 02/03/25 02/04/25 0559   Time of last solid 1730 02/04/25 0559       No results found for: \"HGBA1C\"    Lab Results   Component Value Date    K 3.9 12/10/2023     12/10/2023    CO2 28 12/10/2023    BUN 10 12/10/2023    CREATININE 0.65 12/10/2023    CALCIUM 9.4 12/10/2023    AST 12 12/10/2023    ALT 8 12/10/2023    ALKPHOS 47 12/10/2023    EGFR 103 12/10/2023       No results found for: \"WBC\", \"HGB\", \"HCT\", \"MCV\", \"PLT\"    "

## 2025-02-04 NOTE — ANESTHESIA POSTPROCEDURE EVALUATION
Post-Op Assessment Note    CV Status:  Stable  Pain Score: 0    Pain management: adequate       Mental Status:  Alert and awake   Hydration Status:  Stable   PONV Controlled:  None   Airway Patency:  Patent     Post Op Vitals Reviewed: Yes    No anethesia notable event occurred.    Staff: CRNA           Last Filed PACU Vitals:  Vitals Value Taken Time   Temp     Pulse 69 02/04/25 0807   /56    Resp     SpO2 96 % 02/04/25 0807   Vitals shown include unfiled device data.

## 2025-02-04 NOTE — OP NOTE
OPERATIVE REPORT  PATIENT NAME: Cortney Wayne    :  1970  MRN: [id number]  Pt Location:  OR ROOM 07    SURGERY DATE: 2025    Surgeons and Role:     * Richard Mckeon MD - Primary    Preop Diagnosis:  Trigger thumb, left thumb [M65.312]    Post-Op Diagnosis Codes:     * Trigger thumb, left thumb [M65.312]    Procedure(s):  Left - TENDON SHEATH INCISION OF THUMB    Specimen(s):  * No specimens in log *    Estimated Blood Loss:   Minimal    Drains:  * No LDAs found *    Anesthesia Type:   IV Sedation with Anesthesia    Operative Indications:  Trigger thumb, left thumb [M65.312]      Operative Findings:  Thick A1 pulley      Complications:   None    Procedure and Technique:  Patient was marked in the holding area for release of left trigger thumb injected with local anesthesia draped and prepped in normal sterile fashion after IV sedation a vertical incision was made over the A1 pulley under loupe magnification the A1 pulley was identified and then incised with a 15 blade this was released both distally and proximally with a tenotomy scissors the skin was then area irrigated with normal saline and closed with 5-0 nylon sutures   I was present for the entire procedure.    Patient Disposition:  PACU              SIGNATURE: Richard Mckeon MD  DATE: 2025  TIME: 8:05 AM

## 2025-02-04 NOTE — NURSING NOTE
Pt is awake,alert,tolerated diet, written and verbal instructions given. Dr Mckeon's office will reach out to the patient with further instructions.

## 2025-06-04 ENCOUNTER — ANNUAL EXAM (OUTPATIENT)
Dept: OBGYN CLINIC | Facility: CLINIC | Age: 55
End: 2025-06-04

## 2025-06-04 VITALS
RESPIRATION RATE: 18 BRPM | BODY MASS INDEX: 27.56 KG/M2 | WEIGHT: 140.4 LBS | SYSTOLIC BLOOD PRESSURE: 106 MMHG | HEART RATE: 76 BPM | HEIGHT: 60 IN | DIASTOLIC BLOOD PRESSURE: 61 MMHG

## 2025-06-04 DIAGNOSIS — B00.1 RECURRENT HERPES LABIALIS: ICD-10-CM

## 2025-06-04 DIAGNOSIS — Z12.4 CERVICAL CANCER SCREENING: Primary | ICD-10-CM

## 2025-06-04 DIAGNOSIS — Z01.419 ENCOUNTER FOR ANNUAL ROUTINE GYNECOLOGICAL EXAMINATION: ICD-10-CM

## 2025-06-04 DIAGNOSIS — Z12.31 ENCOUNTER FOR SCREENING MAMMOGRAM FOR MALIGNANT NEOPLASM OF BREAST: ICD-10-CM

## 2025-06-04 PROCEDURE — S0610 ANNUAL GYNECOLOGICAL EXAMINA: HCPCS | Performed by: OBSTETRICS & GYNECOLOGY

## 2025-06-04 PROCEDURE — G0476 HPV COMBO ASSAY CA SCREEN: HCPCS | Performed by: OBSTETRICS & GYNECOLOGY

## 2025-06-04 PROCEDURE — G0145 SCR C/V CYTO,THINLAYER,RESCR: HCPCS | Performed by: OBSTETRICS & GYNECOLOGY

## 2025-06-04 RX ORDER — VALACYCLOVIR HYDROCHLORIDE 500 MG/1
500 TABLET, FILM COATED ORAL DAILY
Qty: 30 TABLET | Refills: 0 | Status: SHIPPED | OUTPATIENT
Start: 2025-06-04 | End: 2025-06-09

## 2025-06-04 NOTE — PROGRESS NOTES
Name: Cortney Wayne      : 1970      MRN: [id number]  Encounter Provider: Aren Penn MD  Encounter Date: 2025   Encounter department: [facility]  :  Assessment & Plan  Cervical cancer screening    Orders:    Liquid-based pap, screening    Recurrent herpes labialis    Orders:    valACYclovir (VALTREX) 500 mg tablet; Take 1 tablet (500 mg total) by mouth daily for 5 days 1 daily x 5 days for each recurrent outbreakprn            Normal breast and GYN exam  Has not gone for her mammogram.  Colonoscopy with polyps  due for repeat at 7 years  Normal Pap smear 2024.  History of low-grade ADRIEN  History of LEEP  History of HSV (presently without any lesions.)      Plan: Check Pap smear.  Rx mammogram.  Recommend healthy diet and exercise.  Rx Valtrex 500 mg daily x 5 days for recurrent outbreak    Cortney Wayne is a 55 y.o. female who presents for annual exam with no complaints.  Denies any pelvic pain vaginal bleeding or dyspareunia.  Denies any breast bowel or bladder issues.  No change in family history.  Medications reviewed.  Working full-time for an insurance company.  Daughter just got her BSN and working at [employer] in the ICU.  Patient is considering part-time job in the medical field.  Eating healthy and exercising regularly.  Medications reviewed.  Requesting a prescription for Valtrex as needed for recurrent outbreaks.  Presently has no symptoms or lesions.       Objective   /61 (BP Location: Right arm, Patient Position: Sitting, Cuff Size: Large)   Pulse 76   Resp 18   Ht 5' (1.524 m)   Wt 63.7 kg (140 lb 6.4 oz)   LMP  (LMP Unknown)   BMI 27.42 kg/m²      Physical Exam  Vitals and nursing note reviewed.   Constitutional:       General: She is not in acute distress.     Appearance: She is well-developed.   HENT:      Head: Normocephalic and atraumatic.     Eyes:      Conjunctiva/sclera: Conjunctivae normal.        Cardiovascular:      Rate and Rhythm: Normal rate and regular rhythm.      Heart sounds: No murmur heard.  Pulmonary:      Effort: Pulmonary effort is normal. No respiratory distress.      Breath sounds: Normal breath sounds.   Chest:   Breasts:     Right: Normal.      Left: Normal.   Abdominal:      Palpations: Abdomen is soft.      Tenderness: There is no abdominal tenderness.   Genitourinary:     Vagina: Normal.      Cervix: Normal.      Uterus: Normal.       Adnexa: Right adnexa normal and left adnexa normal.      Comments: External genitalia normal.  No uterine prolapse cystocele or rectocele.    Musculoskeletal:         General: No swelling.      Cervical back: Neck supple.     Skin:     General: Skin is warm and dry.      Capillary Refill: Capillary refill takes less than 2 seconds.     Neurological:      Mental Status: She is alert.     Psychiatric:         Mood and Affect: Mood normal.

## 2025-06-05 LAB
HPV HR 12 DNA CVX QL NAA+PROBE: NEGATIVE
HPV16 DNA CVX QL NAA+PROBE: NEGATIVE
HPV18 DNA CVX QL NAA+PROBE: NEGATIVE

## 2025-06-10 ENCOUNTER — RESULTS FOLLOW-UP (OUTPATIENT)
Age: 55
End: 2025-06-10

## 2025-06-10 LAB
LAB AP GYN PRIMARY INTERPRETATION: NORMAL
Lab: NORMAL

## (undated) DEVICE — KERLIX BANDAGE ROLL: Brand: KERLIX

## (undated) DEVICE — INSUFLATION TUBING INSUFLOW (LEXION)

## (undated) DEVICE — SYRINGE 10ML LL CONTROL TOP

## (undated) DEVICE — WET SKIN PREP TRAY: Brand: MEDLINE INDUSTRIES, INC.

## (undated) DEVICE — STERILE POLYISOPRENE POWDER-FREE SURGICAL GLOVES: Brand: PROTEXIS

## (undated) DEVICE — INTENDED FOR TISSUE SEPARATION, AND OTHER PROCEDURES THAT REQUIRE A SHARP SURGICAL BLADE TO PUNCTURE OR CUT.: Brand: BARD-PARKER SAFETY BLADES SIZE 15, STERILE

## (undated) DEVICE — STOCKINETTE 2P PREROLLD 6X60

## (undated) DEVICE — ANTI-FOG SOLUTION WITH FOAM PAD: Brand: DEVON

## (undated) DEVICE — ENDOPATH XCEL BLADELESS TROCARS WITH STABILITY SLEEVES: Brand: ENDOPATH XCEL

## (undated) DEVICE — ENSEAL LAPAROSCOPIC TISSUE SEALER G2 CURVED JAW FOR USE WITH G2 GENERATOR 5MM DIAMETER 35CM SHAFT LENGTH: Brand: ENSEAL

## (undated) DEVICE — 3000CC GUARDIAN II: Brand: GUARDIAN

## (undated) DEVICE — CUFF TOURNIQUET 18 X 4 IN QUICK CONNECT DISP 1 BLADDER

## (undated) DEVICE — BETHLEHEM UNIVERSAL  MIONR EXT: Brand: CARDINAL HEALTH

## (undated) DEVICE — SCD SEQUENTIAL COMPRESSION COMFORT SLEEVE MEDIUM KNEE LENGTH: Brand: KENDALL SCD

## (undated) DEVICE — NEEDLE BLUNT 18 G X 1 1/2IN

## (undated) DEVICE — SUT ETHILON 5-0 PS-2 18 IN 1666H

## (undated) DEVICE — DISPOSABLE OR TOWEL: Brand: CARDINAL HEALTH

## (undated) DEVICE — ACE WRAP 4 IN UNSTERILE

## (undated) DEVICE — NEEDLE 25G X 1 1/2

## (undated) DEVICE — ADHESIVE SKN CLSR HISTOACRYL FLEX 0.5ML LF

## (undated) DEVICE — GLOVE SRG BIOGEL ORTHOPEDIC 7.5

## (undated) DEVICE — STERILE MINOR LAPAROSCOPY PACK: Brand: CARDINAL HEALTH

## (undated) DEVICE — ENDOPATH XCEL UNIVERSAL TROCAR STABLILITY SLEEVES: Brand: ENDOPATH XCEL

## (undated) DEVICE — NEEDLE 30 G X 1/2

## (undated) DEVICE — OCCLUSIVE GAUZE STRIP,3% BISMUTH TRIBROMOPHENATE IN PETROLATUM BLEND: Brand: XEROFORM

## (undated) DEVICE — GAUZE SPONGES,16 PLY: Brand: CURITY

## (undated) DEVICE — INTENDED FOR TISSUE SEPARATION, AND OTHER PROCEDURES THAT REQUIRE A SHARP SURGICAL BLADE TO PUNCTURE OR CUT.: Brand: BARD-PARKER SAFETY BLADES SIZE 11, STERILE

## (undated) DEVICE — SYRINGE 10ML LL

## (undated) DEVICE — ENDOPOUCH RETRIEVER SPECIMEN RETRIEVAL BAGS: Brand: ENDOPOUCH RETRIEVER

## (undated) DEVICE — CHLORAPREP HI-LITE 26ML ORANGE

## (undated) DEVICE — MAYO STAND COVER: Brand: CONVERTORS